# Patient Record
Sex: MALE | Employment: UNEMPLOYED | URBAN - METROPOLITAN AREA
[De-identification: names, ages, dates, MRNs, and addresses within clinical notes are randomized per-mention and may not be internally consistent; named-entity substitution may affect disease eponyms.]

---

## 2024-05-16 ENCOUNTER — HOSPITAL ENCOUNTER (INPATIENT)
Age: 44
DRG: 559 | End: 2024-05-16
Attending: STUDENT IN AN ORGANIZED HEALTH CARE EDUCATION/TRAINING PROGRAM | Admitting: STUDENT IN AN ORGANIZED HEALTH CARE EDUCATION/TRAINING PROGRAM
Payer: OTHER GOVERNMENT

## 2024-05-16 DIAGNOSIS — S82.252A CLOSED DISPLACED COMMINUTED FRACTURE OF SHAFT OF LEFT TIBIA, INITIAL ENCOUNTER: Primary | ICD-10-CM

## 2024-05-16 PROCEDURE — 1280000000 HC REHAB R&B

## 2024-05-16 PROCEDURE — 6370000000 HC RX 637 (ALT 250 FOR IP): Performed by: STUDENT IN AN ORGANIZED HEALTH CARE EDUCATION/TRAINING PROGRAM

## 2024-05-16 RX ORDER — GINSENG 100 MG
CAPSULE ORAL 2 TIMES DAILY
Status: DISCONTINUED | OUTPATIENT
Start: 2024-05-16 | End: 2024-05-30 | Stop reason: HOSPADM

## 2024-05-16 RX ORDER — ACETAMINOPHEN 500 MG
1000 TABLET ORAL 3 TIMES DAILY
Status: DISCONTINUED | OUTPATIENT
Start: 2024-05-16 | End: 2024-05-30 | Stop reason: HOSPADM

## 2024-05-16 RX ORDER — POLYETHYLENE GLYCOL 3350 17 G/17G
17 POWDER, FOR SOLUTION ORAL DAILY
Status: DISCONTINUED | OUTPATIENT
Start: 2024-05-16 | End: 2024-05-27

## 2024-05-16 RX ORDER — LANOLIN ALCOHOL/MO/W.PET/CERES
3 CREAM (GRAM) TOPICAL EVERY EVENING
Status: DISCONTINUED | OUTPATIENT
Start: 2024-05-16 | End: 2024-05-30 | Stop reason: HOSPADM

## 2024-05-16 RX ORDER — LIDOCAINE 4 G/G
1 PATCH TOPICAL DAILY
Status: DISCONTINUED | OUTPATIENT
Start: 2024-05-16 | End: 2024-05-30 | Stop reason: HOSPADM

## 2024-05-16 RX ORDER — SENNA AND DOCUSATE SODIUM 50; 8.6 MG/1; MG/1
2 TABLET, FILM COATED ORAL NIGHTLY
Status: DISCONTINUED | OUTPATIENT
Start: 2024-05-16 | End: 2024-05-20

## 2024-05-16 RX ORDER — OXYCODONE HYDROCHLORIDE 5 MG/1
5 TABLET ORAL EVERY 4 HOURS PRN
Status: DISCONTINUED | OUTPATIENT
Start: 2024-05-16 | End: 2024-05-20

## 2024-05-16 RX ORDER — METHOCARBAMOL 500 MG/1
1000 TABLET, FILM COATED ORAL 3 TIMES DAILY
Status: DISPENSED | OUTPATIENT
Start: 2024-05-16 | End: 2024-05-23

## 2024-05-16 RX ORDER — OXYCODONE HYDROCHLORIDE 5 MG/1
10 TABLET ORAL EVERY 4 HOURS PRN
Status: DISCONTINUED | OUTPATIENT
Start: 2024-05-16 | End: 2024-05-20

## 2024-05-16 RX ADMIN — MELATONIN TAB 3 MG 3 MG: 3 TAB at 21:31

## 2024-05-16 RX ADMIN — BACITRACIN: 500 OINTMENT TOPICAL at 21:16

## 2024-05-16 RX ADMIN — METOPROLOL TARTRATE 12.5 MG: 25 TABLET, FILM COATED ORAL at 21:22

## 2024-05-16 RX ADMIN — OXYCODONE 10 MG: 5 TABLET ORAL at 16:56

## 2024-05-16 RX ADMIN — METHOCARBAMOL TABLETS 1000 MG: 500 TABLET, COATED ORAL at 21:22

## 2024-05-16 RX ADMIN — OXYCODONE 10 MG: 5 TABLET ORAL at 21:22

## 2024-05-16 RX ADMIN — ACETAMINOPHEN 1000 MG: 500 TABLET ORAL at 21:21

## 2024-05-16 RX ADMIN — APIXABAN 5 MG: 5 TABLET, FILM COATED ORAL at 21:21

## 2024-05-16 ASSESSMENT — PAIN DESCRIPTION - ORIENTATION
ORIENTATION: RIGHT
ORIENTATION: RIGHT;LOWER
ORIENTATION: RIGHT;LEFT;LOWER;UPPER

## 2024-05-16 ASSESSMENT — PAIN SCALES - GENERAL
PAINLEVEL_OUTOF10: 3
PAINLEVEL_OUTOF10: 3
PAINLEVEL_OUTOF10: 9
PAINLEVEL_OUTOF10: 7
PAINLEVEL_OUTOF10: 2
PAINLEVEL_OUTOF10: 6
PAINLEVEL_OUTOF10: 5
PAINLEVEL_OUTOF10: 3

## 2024-05-16 ASSESSMENT — PAIN DESCRIPTION - ONSET
ONSET: GRADUAL
ONSET: ON-GOING
ONSET: ON-GOING

## 2024-05-16 ASSESSMENT — PAIN DESCRIPTION - LOCATION
LOCATION: ARM;LEG
LOCATION: OTHER (COMMENT)

## 2024-05-16 ASSESSMENT — PAIN DESCRIPTION - DESCRIPTORS
DESCRIPTORS: THROBBING
DESCRIPTORS: ACHING
DESCRIPTORS: ACHING
DESCRIPTORS: THROBBING

## 2024-05-16 ASSESSMENT — PAIN - FUNCTIONAL ASSESSMENT
PAIN_FUNCTIONAL_ASSESSMENT: PREVENTS OR INTERFERES SOME ACTIVE ACTIVITIES AND ADLS

## 2024-05-16 ASSESSMENT — PAIN DESCRIPTION - FREQUENCY
FREQUENCY: CONTINUOUS

## 2024-05-16 ASSESSMENT — PAIN SCALES - WONG BAKER: WONGBAKER_NUMERICALRESPONSE: NO HURT

## 2024-05-16 NOTE — PLAN OF CARE
ARU PATIENT TREATMENT PLAN  [facility]  [address] [place] [address]  [phone]      Aly Bernard    : 1980  Acct #: [id number]  MRN: [id number]  PHYSICIAN:  Ryan Solitario MD  Primary Problem    Patient Active Problem List   Diagnosis    Closed displaced comminuted fracture of shaft of left tibia, initial encounter       Rehabilitation Diagnosis:      #. Left comminuted and displaced left third metacarpal base fracture, minimally displaced left fourth proximal phalanx fracture, left radioulnar dislocation  - managed non-op  - NWB LUE  - Maintain splint     #. Left mid-shaft tibial fracture  - s/p IM nail on   - WBAT     #. Right knee soft tissue injuries  - s/p I&D w/ wound closure on   - WBAT in knee immobilizer.      #. Grade 1-2 B CVI right vertebral artery  #. 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 SVT  - s/p adenosine on   - TTE on  with normal EF, no RWMA  - metoprolol 12.5 mg BID     #. Diffuse abrasions  - Bacitracin BID     #. Mild TBI  - (+) post-traumatic amnesia, unknown duration of LOC  - Hx of prior TBI, mood disorder  - No post-concussive symptoms at present. Cognitive eval WFL at [facility]      #. EtOH use disorder  - Pt endorses insight into problematic drinking habits  - Interested in community resources for sobriety, appreciate SW/CM assistance in providing patient with this     Chronic Conditions  - HLD: Previously on fenofibrate (has not taken in over 1 year). Not recommended at discharge from [facility]. Will discuss resuming with patient while on ARU.   - Morbid obesity    ADMIT DATE:2024    Patient Goals: To get back to being active  Admitting Impairments: Orthopedic Disorder - 8.4 - Major Multiple Fractures  Activities: Impaired Eating, Hygiene, Toileting, Bathing, Dressing, Bed Mobility, Transfers, Ambulation, Stairs, and Endurance.  Participation: Prior to admission  patient was living at home with significant other, was independent with all mobility and activities, was an active , working full time.     CARE PLAN     NURSING:  Aly Bernard while on this unit will:  [] Be continent of bowel and bladder     [x] Have an adequate number of bowel movements  [x] Urinate with no urinary retention >300ml in bladder  [] Complete bladder protocol with khanna removal  [] Maintain O2 SATs at ___%  [x] Have pain managed while on ARU       [] Be pain free by discharge   [x] Have no skin breakdown while on ARU  [x] Have improved skin integrity via wound measurements  [x] Have no signs/symptoms of infection at the wound site  [x] Be free from injury during hospitalization   [x] Complete education with patient/family with understanding demonstrated for:  [x] Adjustment   [] Other:     Nursing Interventions will include:  [] bowel/bladder training   [x] education for medical assistive devices   [x] medication education   [] O2 saturation management   [x] energy conservation   [x] stress management techniques   [x] fall prevention   [x] alarms protocol   [x] seating and positioning   [x] skin/wound care   [x] pressure relief instruction   [x] dressing changes     [x] infection protection   [x] DVT prophylaxis  [] assistance with in room safety with transfers to bed, toilet, wheelchair, shower   [x] bathroom activities and hygiene  [] Other:    Patient/Caregiver Education for:  [x] Disease/sustained injury/management     [x] Medication Use  [] Surgical intervention  [x] Safety  [x] Body mechanics and or joint protection  [x] Health maintenance     [] Other:     PHYSICAL THERAPY:  Goals:                   Short Term Goals:  Time Frame: 1-2 weeks  Patient will complete bed mobility at minimal assistance   Patient will complete transfers at minimal assistance   Patient will ambulate 25 ft with use of LRAD at minimal assistance  Patient will ascend/descend 1 stairs with (B) handrail at moderate  PT, ADRIANA [id number]  5/17/24  1:18 PM

## 2024-05-16 NOTE — PROGRESS NOTES
ARU Admission Assessment    Ethnicity  \"Are you of , /a, or [place]an origin?\"  Check all that apply:  [] A.  No, not of , /a, or [place]an Origin  [] B.  Yes, Yemeni, Yemeni American, Chicano/a  [x] C.  Yes, Kazakh  [] D.  Yes, [place]  [x] E.  Yes, another , , or [place]an origin  [] X.  Patient unable to respond  [] Y.  Patient declines to respond    Race  \"What is your race?\"  Check all that apply:  [] A.  White  [] B.  Black or   [] C.   or   [] D.   Citizen of [place]  [] E.  Chinese  [] F.  Northern Irish  [] G. Panamanian  [] H.  Icelandic  [] I.  Mongolian  [] J.  Other   [] K.    [] L.  Zimbabwean or Shashank  [] M.  Martiniquais  [x] N.  Other   [] X.  Patient unable to respond  [] Y.  Patient declines to respond  [] Z.  None of the above    Language  A.  \"What is your preferred language?\"   english    B.  \"Do you need or want an  to communicate with a doctor or health care staff?\"  Check only one:  [x] 0.  No  [] 1.  Yes  [] 9.  Unable to determine    Transportation  \"Has lack of transportation kept you from medical appointments, meetings, work, or from getting things needed for daily living?\"Check all that apply:  [] A.  Yes, it has kept me from medical appointments or from getting my medications  [] B.  Yes, it has kept me from non-medical meetings, appointments, work, or from getting things that I need  [x] C.  No  [] X.  Patient unable to respond  [] Y.  Patient declines to respond    Hearing  Ability to hear (with hearing aid or hearing appliances if normally used)  []  0.  Adequate - no difficulty in normal conversation, social interaction, listening to TV  [x]  1.  Minimal difficulty - difficulty in some environments (e.g. when person speaks softly or setting is noisy)  []  2.  Moderate difficulty - speaker has to increase volume and speak distinctly   []  3.  Highly impaired - absence of  score 0-27, or enter 99 if unable to complete (if symptom frequency (column 2) is blank for 3 or more items).   0     Social Isolation  \"How often do you feel lonely or isolated from those around you?\"  [x] 0.  Never  [] 1.  Rarely  [] 2.  Sometimes  [] 3.  Often  [] 4.  Always  [] 7.  Patient declines to respond  [] 8.  Patient unable to respond    Pain Effect on Sleep  \"Over the past 5 days, how much of the time has pain made it hard for you to sleep at night?\"  []  0.  Does not apply - I have not had any pain or hurting in the past 5 days  []  1.  Rarely or not at all  []  2.  Occasionally  [x]  3.  Frequently  []  4.  Almost constantly  []  8.  Unable to answer    **If the patient answers \"0.  Does not apply\" to this question, skip the next two \"Pain Effect...\" questions**    Pain Interference with Therapy Activities  \"Over the past 5 days, how often have you limited your participation in rehabilitation therapy sessions due to pain?\"  []  0.  Does not apply - I have not received rehabilitation therapy in the past 5 days  []  1.  Rarely or not at all  []  2.  Occasionally  []  3.  Frequently  [x]  4.  Almost constantly  []  8.  Unable to answer    Pain Interference with Day-to-Day Activities:  \"Over the past 5 days, how often have you limited your day-to-day activities (excluding rehabilitation therapy session)?\"  []  1.  Rarely or not at all  []  2.  Occasionally  []  3.  Frequently  [x]  4.  Almost constantly  []  8.  Unable to answer    Nutritional Approaches  Check all of the following nutritional approaches that apply on admission:  []  A.  Parenteral/IV feeding (including IV fluids if needed for hydration, but not as part of dialysis/chemo)  []  B.  Feeding tube (e.g., nasogastric or abdominal (PEG))  []  C.  Mechanically altered diet - requires change in texture of food or liquids (e.g., pureed food, thickened liquids)  []  D.  Therapeutic diet (e.g., low salt, diabetic, low cholesterol)  [x]  Z.  None of  the above    High Risk Drug Classes:  Use and Indication    Is taking: Check if the pt is taking any medications by pharmacological classification, not how it is used, in the following classes  Indication noted: If column 1 is checked, check if there is an indication noted for all meds in the drug class Is taking  (check all that apply) Indication noted (check all that apply)   Antipsychotic [] []   Anticoagulant [x] [x]   Antibiotic [x] [x]   Opioid [x] [x]   Antiplatelet [] []   Hypoglycemic (including insulin) [] []   None of the above []     Special Treatments, Procedures, and Programs    Check all of the following treatments, procedures, and programs that apply on admission. On admission (check all that apply)   Cancer Treatments   A1. Chemotherapy []           A2. IV []           A3. Oral []           A10. Other []   B1. Radiation []   Respiratory Therapies   C1. Oxygen Therapy []           C2. Continuous (continuously for at least 14 hours per day) []           C3. Intermittent []           C4. High-concentration []   D1. Suctioning (Does not include oral suctioning) []           D2. Scheduled []           D3. As needed []   E1. Tracheostomy Care []   F1. Invasive Mechanical Ventilator (ventilator or respirator) []   G1. Non-invasive Mechanical Ventilator []           G2. BiPAP []           G3. CPAP []   Other   H1. IV Medications (Do not include sub Q pumps, flushes, Dextrose 50% or lactated ringers) []           H2. Vasoactive medications []           H3. Antibiotics []           H4. Anticoagulation []           H10. Other []   I1. Transfusions []   J1. Dialysis []           J2. Hemodialysis []           J3. Peritoneal dialysis []   O1. IV access (including a catheter in a vein) []           O2. Peripheral []           O3. Midline []           O4. Central (PICC, tunneled, port) []      None of the above (select if no Cancer, Respiratory, or Other boxes are checked) [x]     The above items have been reviewed

## 2024-05-16 NOTE — PROGRESS NOTES
Patient was admitted to room 4910 at 1600.  Patient was oriented to the Call Light, Phone, TV, Thermostat, Bed Controls, Bathroom and Emergency Cord.  Patient verbalized and demonstrated understanding of all.  Patient was also given an overview of Unit Routines for Acute Rehab, including the patient's rights and responsibilities as well as the CMS IRF [employer] Privacy Act Statement providing notice of data collection.  Patient states that their normal bowel regime is daily. Meal times were explained, including how to order food.  The white board, (which is posted on the wall by the door is used for communication) has the Therapy Scheduled that is posted each day along with the name of your doctor, nurse, and therapist for your convenience.  We recommend any family that will be care givers or any care givers the patient has, take part in therapy.  We have no set visiting hours, we suggest non-caregiver friends and family visitors come after therapy (at 4 PM or later) to allow patient to rest in between sessions.      In conjunction with the patient and patient’s family, this nurse worked to establish a tailored Fall TIPS plan to ensure patient safety and compliance:    Falls TIPS Completion    Patient identified as increased risk for harm if fall:  [] Yes     Fall Risks  History of Falls:    [] Yes   Medication Side Effects:   [x] Yes   Walking Aid:    [x] Yes   IV Pole or Equipment:   [] Yes   Unsteady Walk:     [x] Yes   May Forget or Choose Not to Call: [x] Yes     Fall Interventions   Communicate Recent Fall and/or Risk of Harm: [] Yes   Walking Aids:  Crutches: [] Yes   Cane: [] Yes   Walker: [x] Yes   IV Assistance When Walking: [] Yes   Toileting Schedule: Every 2 Hours  Bedpan:   [] Yes   Assist to Commode: [] Yes   Assist to Bathroom: [x] Yes   Bed Alarm On: [x] Yes   Assistance Out of Bed:  Bedrest: [] Yes   1 Person: [] Yes   2 People: [x] Yes

## 2024-05-16 NOTE — PROGRESS NOTES
4 Eyes Skin Assessment     NAME:  Aly Bernard  YOB: 1980  MEDICAL RECORD NUMBER:  [id number]    The patient is being assessed for  Admission    I agree that at least one RN has performed a thorough Head to Toe Skin Assessment on the patient. ALL assessment sites listed below have been assessed.      Areas assessed by both nurses:    Head, Face, Ears, Shoulders, Back, Chest, Arms, Elbows, Hands, Sacrum. Buttock, Coccyx, Ischium, and Legs. Feet and Heels        Does the Patient have a Wound? Yes wound(s) were present on assessment. LDA wound assessment was Initiated and completed by RN        Darío Prevention initiated by RN: Yes  Wound Care Orders initiated by RN: Yes    Pressure Injury (Stage 3,4, Unstageable, DTI, NWPT, and Complex wounds) if present, place Wound referral order by RN under : No    New Ostomies, if present place, Ostomy referral order under : No     Nurse 1 eSignature: Electronically signed by Jackie Brewster RN on 5/16/24 at 6:01 PM EDT    **SHARE this note so that the co-signing nurse can place an eSignature**    Nurse 2 eSignature: Electronically signed by Heather Boyd RN on 5/16/24 at 6:02 PM EDT

## 2024-05-17 LAB
ANION GAP SERPL CALCULATED.3IONS-SCNC: 14 MMOL/L (ref 3–16)
BASOPHILS # BLD: 0.1 K/UL (ref 0–0.2)
BASOPHILS NFR BLD: 1 %
BUN SERPL-MCNC: 17 MG/DL (ref 7–20)
CALCIUM SERPL-MCNC: 9.6 MG/DL (ref 8.3–10.6)
CHLORIDE SERPL-SCNC: 97 MMOL/L (ref 99–110)
CO2 SERPL-SCNC: 22 MMOL/L (ref 21–32)
CREAT SERPL-MCNC: 1.1 MG/DL (ref 0.9–1.3)
DEPRECATED RDW RBC AUTO: 12.8 % (ref 12.4–15.4)
EOSINOPHIL # BLD: 0.3 K/UL (ref 0–0.6)
EOSINOPHIL NFR BLD: 3 %
GFR SERPLBLD CREATININE-BSD FMLA CKD-EPI: 85 ML/MIN/{1.73_M2}
GLUCOSE SERPL-MCNC: 112 MG/DL (ref 70–99)
HCT VFR BLD AUTO: 42.4 % (ref 40.5–52.5)
HGB BLD-MCNC: 13.9 G/DL (ref 13.5–17.5)
LYMPHOCYTES # BLD: 2.1 K/UL (ref 1–5.1)
LYMPHOCYTES NFR BLD: 23.5 %
MCH RBC QN AUTO: 29.7 PG (ref 26–34)
MCHC RBC AUTO-ENTMCNC: 32.8 G/DL (ref 31–36)
MCV RBC AUTO: 90.7 FL (ref 80–100)
MONOCYTES # BLD: 1 K/UL (ref 0–1.3)
MONOCYTES NFR BLD: 11.7 %
NEUTROPHILS # BLD: 5.3 K/UL (ref 1.7–7.7)
NEUTROPHILS NFR BLD: 60.8 %
PLATELET # BLD AUTO: 381 K/UL (ref 135–450)
PMV BLD AUTO: 8.5 FL (ref 5–10.5)
POTASSIUM SERPL-SCNC: 4.5 MMOL/L (ref 3.5–5.1)
RBC # BLD AUTO: 4.68 M/UL (ref 4.2–5.9)
SODIUM SERPL-SCNC: 133 MMOL/L (ref 136–145)
WBC # BLD AUTO: 8.7 K/UL (ref 4–11)

## 2024-05-17 PROCEDURE — 97535 SELF CARE MNGMENT TRAINING: CPT

## 2024-05-17 PROCEDURE — 6370000000 HC RX 637 (ALT 250 FOR IP): Performed by: STUDENT IN AN ORGANIZED HEALTH CARE EDUCATION/TRAINING PROGRAM

## 2024-05-17 PROCEDURE — 97530 THERAPEUTIC ACTIVITIES: CPT

## 2024-05-17 PROCEDURE — 97162 PT EVAL MOD COMPLEX 30 MIN: CPT

## 2024-05-17 PROCEDURE — 36415 COLL VENOUS BLD VENIPUNCTURE: CPT

## 2024-05-17 PROCEDURE — 80048 BASIC METABOLIC PNL TOTAL CA: CPT

## 2024-05-17 PROCEDURE — 1280000000 HC REHAB R&B

## 2024-05-17 PROCEDURE — 97166 OT EVAL MOD COMPLEX 45 MIN: CPT

## 2024-05-17 PROCEDURE — 85025 COMPLETE CBC W/AUTO DIFF WBC: CPT

## 2024-05-17 RX ADMIN — OXYCODONE 10 MG: 5 TABLET ORAL at 18:10

## 2024-05-17 RX ADMIN — MELATONIN TAB 3 MG 3 MG: 3 TAB at 21:54

## 2024-05-17 RX ADMIN — BACITRACIN: 500 OINTMENT TOPICAL at 21:56

## 2024-05-17 RX ADMIN — METHOCARBAMOL TABLETS 1000 MG: 500 TABLET, COATED ORAL at 09:10

## 2024-05-17 RX ADMIN — METOPROLOL TARTRATE 12.5 MG: 25 TABLET, FILM COATED ORAL at 09:56

## 2024-05-17 RX ADMIN — APIXABAN 5 MG: 5 TABLET, FILM COATED ORAL at 09:56

## 2024-05-17 RX ADMIN — ACETAMINOPHEN 1000 MG: 500 TABLET ORAL at 09:56

## 2024-05-17 RX ADMIN — APIXABAN 5 MG: 5 TABLET, FILM COATED ORAL at 21:55

## 2024-05-17 RX ADMIN — ACETAMINOPHEN 1000 MG: 500 TABLET ORAL at 14:24

## 2024-05-17 RX ADMIN — OXYCODONE 10 MG: 5 TABLET ORAL at 06:33

## 2024-05-17 RX ADMIN — METHOCARBAMOL TABLETS 1000 MG: 500 TABLET, COATED ORAL at 21:55

## 2024-05-17 RX ADMIN — OXYCODONE 10 MG: 5 TABLET ORAL at 12:41

## 2024-05-17 RX ADMIN — POLYETHYLENE GLYCOL 3350 17 G: 17 POWDER, FOR SOLUTION ORAL at 09:57

## 2024-05-17 RX ADMIN — ACETAMINOPHEN 1000 MG: 500 TABLET ORAL at 21:54

## 2024-05-17 RX ADMIN — METHOCARBAMOL TABLETS 1000 MG: 500 TABLET, COATED ORAL at 14:24

## 2024-05-17 RX ADMIN — BACITRACIN: 500 OINTMENT TOPICAL at 14:23

## 2024-05-17 RX ADMIN — METOPROLOL TARTRATE 12.5 MG: 25 TABLET, FILM COATED ORAL at 21:55

## 2024-05-17 ASSESSMENT — PAIN DESCRIPTION - LOCATION
LOCATION: ABDOMEN;GENERALIZED
LOCATION: ABDOMEN
LOCATION: GENERALIZED
LOCATION: ABDOMEN;GENERALIZED
LOCATION: GENERALIZED
LOCATION: LEG;ARM;BACK
LOCATION: GENERALIZED
LOCATION: GENERALIZED

## 2024-05-17 ASSESSMENT — PAIN DESCRIPTION - ORIENTATION
ORIENTATION: RIGHT;LEFT
ORIENTATION: RIGHT
ORIENTATION: RIGHT;LEFT;LOWER

## 2024-05-17 ASSESSMENT — PAIN DESCRIPTION - ONSET: ONSET: ON-GOING

## 2024-05-17 ASSESSMENT — PAIN SCALES - GENERAL
PAINLEVEL_OUTOF10: 7
PAINLEVEL_OUTOF10: 9
PAINLEVEL_OUTOF10: 6
PAINLEVEL_OUTOF10: 3
PAINLEVEL_OUTOF10: 7
PAINLEVEL_OUTOF10: 3
PAINLEVEL_OUTOF10: 6
PAINLEVEL_OUTOF10: 2
PAINLEVEL_OUTOF10: 9
PAINLEVEL_OUTOF10: 9
PAINLEVEL_OUTOF10: 7
PAINLEVEL_OUTOF10: 3

## 2024-05-17 ASSESSMENT — PAIN DESCRIPTION - FREQUENCY: FREQUENCY: CONTINUOUS

## 2024-05-17 ASSESSMENT — PAIN DESCRIPTION - DESCRIPTORS
DESCRIPTORS: ACHING;CRAMPING
DESCRIPTORS: CRAMPING
DESCRIPTORS: ACHING

## 2024-05-17 ASSESSMENT — PAIN - FUNCTIONAL ASSESSMENT
PAIN_FUNCTIONAL_ASSESSMENT: ACTIVITIES ARE NOT PREVENTED
PAIN_FUNCTIONAL_ASSESSMENT: ACTIVITIES ARE NOT PREVENTED

## 2024-05-17 ASSESSMENT — PAIN SCALES - WONG BAKER: WONGBAKER_NUMERICALRESPONSE: NO HURT
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
## 2024-05-17 NOTE — PLAN OF CARE
Problem: Pain  Goal: Verbalizes/displays adequate comfort level or baseline comfort level  Outcome: Progressing  Flowsheets (Taken 5/16/2024 1645 by Jackie Brewster, RN)  Verbalizes/displays adequate comfort level or baseline comfort level: Encourage patient to monitor pain and request assistance     Problem: ABCDS Injury Assessment  Goal: Absence of physical injury  Outcome: Progressing  Flowsheets (Taken 5/16/2024 1632 by Jackie Brewster, RN)  Absence of Physical Injury: Implement safety measures based on patient assessment     Problem: Skin/Tissue Integrity  Goal: Absence of new skin breakdown  Description: 1.  Monitor for areas of redness and/or skin breakdown  2.  Assess vascular access sites hourly  3.  Every 4-6 hours minimum:  Change oxygen saturation probe site  4.  Every 4-6 hours:  If on nasal continuous positive airway pressure, respiratory therapy assess nares and determine need for appliance change or resting period.  Outcome: Progressing     Problem: Safety - Adult  Goal: Free from fall injury  Outcome: Progressing
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
## 2024-05-17 NOTE — PROGRESS NOTES
Admission Period/Goal QM Codes for Aly Bernard.    QM Admit Code Goal Code   Eating     Oral Hygiene     Toileting Hygiene     Shower/Bathing     UB Dressing     LB Dressing     Putting on/off Footwear     Rolling Left and Right     Sit To Lying     Lying to Sitting on Bedside     Sit to Stand     Chair/Bed to Chair Transfer     Toilet Transfers     Car Transfers     Walk 10 Feet     Walk 50 Feet with Two Turns     Walk 150 Feet     Walk 10 Feet on Uneven Surfaces     1 Step (Curb)     4 Steps     12 Steps     Picking up Object from Floor     Wheel 50 Feet with 2 Turns     Type     Wheel 150 Feet     Type         The above codes were determined by the treatment team to be the patient's accurate admission assessment codes based on assessment performed soon after the patient's admission and prior to the benefit of services provided by staff, or if appropriate, the patient's usual performance at admission.    OT:       PT:       RN:       ST:       :
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
## 2024-05-18 PROCEDURE — 1280000000 HC REHAB R&B

## 2024-05-18 PROCEDURE — 97535 SELF CARE MNGMENT TRAINING: CPT

## 2024-05-18 PROCEDURE — 97530 THERAPEUTIC ACTIVITIES: CPT

## 2024-05-18 PROCEDURE — 6370000000 HC RX 637 (ALT 250 FOR IP): Performed by: STUDENT IN AN ORGANIZED HEALTH CARE EDUCATION/TRAINING PROGRAM

## 2024-05-18 RX ADMIN — OXYCODONE 10 MG: 5 TABLET ORAL at 21:17

## 2024-05-18 RX ADMIN — ACETAMINOPHEN 1000 MG: 500 TABLET ORAL at 08:26

## 2024-05-18 RX ADMIN — METHOCARBAMOL TABLETS 1000 MG: 500 TABLET, COATED ORAL at 08:25

## 2024-05-18 RX ADMIN — BACITRACIN: 500 OINTMENT TOPICAL at 21:22

## 2024-05-18 RX ADMIN — METHOCARBAMOL TABLETS 1000 MG: 500 TABLET, COATED ORAL at 21:18

## 2024-05-18 RX ADMIN — MELATONIN TAB 3 MG 3 MG: 3 TAB at 21:17

## 2024-05-18 RX ADMIN — OXYCODONE 10 MG: 5 TABLET ORAL at 05:00

## 2024-05-18 RX ADMIN — ACETAMINOPHEN 1000 MG: 500 TABLET ORAL at 15:34

## 2024-05-18 RX ADMIN — ACETAMINOPHEN 1000 MG: 500 TABLET ORAL at 21:17

## 2024-05-18 RX ADMIN — OXYCODONE 10 MG: 5 TABLET ORAL at 10:35

## 2024-05-18 RX ADMIN — SENNOSIDES AND DOCUSATE SODIUM 2 TABLET: 50; 8.6 TABLET ORAL at 21:17

## 2024-05-18 RX ADMIN — BACITRACIN: 500 OINTMENT TOPICAL at 08:27

## 2024-05-18 RX ADMIN — APIXABAN 5 MG: 5 TABLET, FILM COATED ORAL at 21:18

## 2024-05-18 RX ADMIN — METOPROLOL TARTRATE 12.5 MG: 25 TABLET, FILM COATED ORAL at 21:18

## 2024-05-18 RX ADMIN — OXYCODONE 10 MG: 5 TABLET ORAL at 15:37

## 2024-05-18 RX ADMIN — APIXABAN 5 MG: 5 TABLET, FILM COATED ORAL at 08:27

## 2024-05-18 RX ADMIN — METHOCARBAMOL TABLETS 1000 MG: 500 TABLET, COATED ORAL at 15:34

## 2024-05-18 RX ADMIN — METOPROLOL TARTRATE 12.5 MG: 25 TABLET, FILM COATED ORAL at 08:27

## 2024-05-18 ASSESSMENT — PAIN DESCRIPTION - DESCRIPTORS
DESCRIPTORS: ACHING
DESCRIPTORS: THROBBING

## 2024-05-18 ASSESSMENT — PAIN - FUNCTIONAL ASSESSMENT
PAIN_FUNCTIONAL_ASSESSMENT: ACTIVITIES ARE NOT PREVENTED
PAIN_FUNCTIONAL_ASSESSMENT: ACTIVITIES ARE NOT PREVENTED
PAIN_FUNCTIONAL_ASSESSMENT: PREVENTS OR INTERFERES SOME ACTIVE ACTIVITIES AND ADLS
PAIN_FUNCTIONAL_ASSESSMENT: ACTIVITIES ARE NOT PREVENTED

## 2024-05-18 ASSESSMENT — PAIN DESCRIPTION - ORIENTATION
ORIENTATION: RIGHT;LEFT
ORIENTATION: RIGHT;LEFT;MID
ORIENTATION: LEFT

## 2024-05-18 ASSESSMENT — PAIN SCALES - GENERAL
PAINLEVEL_OUTOF10: 7
PAINLEVEL_OUTOF10: 8
PAINLEVEL_OUTOF10: 7
PAINLEVEL_OUTOF10: 3
PAINLEVEL_OUTOF10: 5
PAINLEVEL_OUTOF10: 3
PAINLEVEL_OUTOF10: 7

## 2024-05-18 ASSESSMENT — PAIN DESCRIPTION - LOCATION
LOCATION: LEG
LOCATION: BACK;ARM;LEG
LOCATION: ARM;LEG

## 2024-05-18 ASSESSMENT — PAIN DESCRIPTION - FREQUENCY: FREQUENCY: CONTINUOUS
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
## 2024-05-18 NOTE — PLAN OF CARE
Problem: Safety - Adult  Goal: Free from fall injury  5/18/2024 1819 by Anni Pierre RN  Outcome: Progressing  Note: Pt remains free from falls.  Safety precautions in place.  Bed in lowest position, bed/chair wheels locked, call light with in reach, bedside table in reach, bed/chair alarm on, fall risk wrist band on.

## 2024-05-18 NOTE — PLAN OF CARE
Problem: Safety - Adult  Goal: Free from fall injury  5/18/2024 1249 by Anni Pierre RN  Outcome: Progressing  Note: Pt remains free from falls.  Safety precautions in place.  Bed in lowest position, bed/chair wheels locked, call light with in reach, bedside table in reach, bed/chair alarm on, fall risk wrist band on.
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
## 2024-05-19 VITALS
BODY MASS INDEX: 41.39 KG/M2 | HEIGHT: 70 IN | HEART RATE: 80 BPM | OXYGEN SATURATION: 97 % | WEIGHT: 289.1 LBS | TEMPERATURE: 97.9 F | RESPIRATION RATE: 18 BRPM | SYSTOLIC BLOOD PRESSURE: 97 MMHG | DIASTOLIC BLOOD PRESSURE: 61 MMHG

## 2024-05-19 PROCEDURE — 6370000000 HC RX 637 (ALT 250 FOR IP): Performed by: STUDENT IN AN ORGANIZED HEALTH CARE EDUCATION/TRAINING PROGRAM

## 2024-05-19 PROCEDURE — 1280000000 HC REHAB R&B

## 2024-05-19 RX ADMIN — ACETAMINOPHEN 1000 MG: 500 TABLET ORAL at 14:27

## 2024-05-19 RX ADMIN — OXYCODONE 10 MG: 5 TABLET ORAL at 21:16

## 2024-05-19 RX ADMIN — METHOCARBAMOL TABLETS 1000 MG: 500 TABLET, COATED ORAL at 14:28

## 2024-05-19 RX ADMIN — BACITRACIN: 500 OINTMENT TOPICAL at 21:23

## 2024-05-19 RX ADMIN — METOPROLOL TARTRATE 12.5 MG: 25 TABLET, FILM COATED ORAL at 08:39

## 2024-05-19 RX ADMIN — MELATONIN TAB 3 MG 3 MG: 3 TAB at 21:16

## 2024-05-19 RX ADMIN — METHOCARBAMOL TABLETS 1000 MG: 500 TABLET, COATED ORAL at 21:15

## 2024-05-19 RX ADMIN — METHOCARBAMOL TABLETS 1000 MG: 500 TABLET, COATED ORAL at 08:39

## 2024-05-19 RX ADMIN — METOPROLOL TARTRATE 12.5 MG: 25 TABLET, FILM COATED ORAL at 22:32

## 2024-05-19 RX ADMIN — OXYCODONE 5 MG: 5 TABLET ORAL at 12:09

## 2024-05-19 RX ADMIN — APIXABAN 5 MG: 5 TABLET, FILM COATED ORAL at 08:39

## 2024-05-19 RX ADMIN — OXYCODONE 5 MG: 5 TABLET ORAL at 08:39

## 2024-05-19 RX ADMIN — BACITRACIN: 500 OINTMENT TOPICAL at 08:40

## 2024-05-19 RX ADMIN — ACETAMINOPHEN 1000 MG: 500 TABLET ORAL at 08:39

## 2024-05-19 RX ADMIN — APIXABAN 5 MG: 5 TABLET, FILM COATED ORAL at 21:16

## 2024-05-19 RX ADMIN — ACETAMINOPHEN 1000 MG: 500 TABLET ORAL at 21:16

## 2024-05-19 ASSESSMENT — PAIN SCALES - GENERAL
PAINLEVEL_OUTOF10: 7
PAINLEVEL_OUTOF10: 3
PAINLEVEL_OUTOF10: 7
PAINLEVEL_OUTOF10: 0
PAINLEVEL_OUTOF10: 8
PAINLEVEL_OUTOF10: 4

## 2024-05-19 ASSESSMENT — PAIN - FUNCTIONAL ASSESSMENT
PAIN_FUNCTIONAL_ASSESSMENT: ACTIVITIES ARE NOT PREVENTED

## 2024-05-19 ASSESSMENT — PAIN DESCRIPTION - LOCATION: LOCATION: LEG

## 2024-05-19 ASSESSMENT — PAIN DESCRIPTION - ORIENTATION: ORIENTATION: LEFT

## 2024-05-19 ASSESSMENT — PAIN DESCRIPTION - DESCRIPTORS: DESCRIPTORS: THROBBING

## 2024-05-19 ASSESSMENT — PAIN DESCRIPTION - FREQUENCY: FREQUENCY: CONTINUOUS

## 2024-05-19 NOTE — PLAN OF CARE
Problem: Safety - Adult  Goal: Free from fall injury  5/19/2024 1038 by Anni Pierre RN  Outcome: Progressing  Note: Pt remains free from falls.  Safety precautions in place.  Bed in lowest position, bed/chair wheels locked, call light with in reach, bedside table in reach, bed/chair alarm on, fall risk wrist band on.  5/19/2024 0154 by Palma Mims RN  Outcome: Progressing     Problem: Pain  Goal: Verbalizes/displays adequate comfort level or baseline comfort level  5/19/2024 0154 by Palma Mims RN  Outcome: Progressing  5/19/2024 0151 by Palma Mims RN  Outcome: Progressing

## 2024-05-19 NOTE — PLAN OF CARE
Problem: Discharge Planning  Goal: Discharge to home or other facility with appropriate resources  Outcome: Progressing     Problem: ABCDS Injury Assessment  Goal: Absence of physical injury  5/19/2024 0154 by Palma Mims RN  Outcome: Progressing  5/19/2024 0151 by Palma Mims RN  Outcome: Progressing     Problem: Pain  Goal: Verbalizes/displays adequate comfort level or baseline comfort level  5/19/2024 0154 by Palma Mims RN  Outcome: Progressing     Problem: Pain  Goal: Verbalizes/displays adequate comfort level or baseline comfort level  5/19/2024 0151 by Palma Mims RN  Outcome: Progressing     Problem: Safety - Adult  Goal: Free from fall injury  5/19/2024 0154 by Palma Mims RN  Outcome: Progressing

## 2024-05-19 NOTE — PLAN OF CARE
Problem: Discharge Planning  Goal: Discharge to home or other facility with appropriate resources  Outcome: Progressing     Problem: ABCDS Injury Assessment  Goal: Absence of physical injury  Outcome: Progressing     Problem: Pain  Goal: Verbalizes/displays adequate comfort level or baseline comfort level  Outcome: Progressing

## 2024-05-20 PROCEDURE — 6370000000 HC RX 637 (ALT 250 FOR IP): Performed by: STUDENT IN AN ORGANIZED HEALTH CARE EDUCATION/TRAINING PROGRAM

## 2024-05-20 PROCEDURE — 97535 SELF CARE MNGMENT TRAINING: CPT

## 2024-05-20 PROCEDURE — 97530 THERAPEUTIC ACTIVITIES: CPT

## 2024-05-20 PROCEDURE — 1280000000 HC REHAB R&B

## 2024-05-20 PROCEDURE — 97116 GAIT TRAINING THERAPY: CPT

## 2024-05-20 RX ORDER — OXYCODONE HYDROCHLORIDE 5 MG/1
5 TABLET ORAL EVERY 4 HOURS PRN
Status: DISCONTINUED | OUTPATIENT
Start: 2024-05-20 | End: 2024-05-30 | Stop reason: HOSPADM

## 2024-05-20 RX ORDER — SENNA AND DOCUSATE SODIUM 50; 8.6 MG/1; MG/1
2 TABLET, FILM COATED ORAL DAILY PRN
Status: DISCONTINUED | OUTPATIENT
Start: 2024-05-20 | End: 2024-05-30 | Stop reason: HOSPADM

## 2024-05-20 RX ADMIN — POLYETHYLENE GLYCOL 3350 17 G: 17 POWDER, FOR SOLUTION ORAL at 09:17

## 2024-05-20 RX ADMIN — METOPROLOL TARTRATE 12.5 MG: 25 TABLET, FILM COATED ORAL at 10:24

## 2024-05-20 RX ADMIN — ACETAMINOPHEN 1000 MG: 500 TABLET ORAL at 22:09

## 2024-05-20 RX ADMIN — METHOCARBAMOL TABLETS 1000 MG: 500 TABLET, COATED ORAL at 22:12

## 2024-05-20 RX ADMIN — APIXABAN 5 MG: 5 TABLET, FILM COATED ORAL at 22:10

## 2024-05-20 RX ADMIN — OXYCODONE 10 MG: 5 TABLET ORAL at 06:35

## 2024-05-20 RX ADMIN — ACETAMINOPHEN 1000 MG: 500 TABLET ORAL at 09:17

## 2024-05-20 RX ADMIN — APIXABAN 5 MG: 5 TABLET, FILM COATED ORAL at 09:17

## 2024-05-20 RX ADMIN — OXYCODONE 5 MG: 5 TABLET ORAL at 22:15

## 2024-05-20 RX ADMIN — ACETAMINOPHEN 1000 MG: 500 TABLET ORAL at 14:16

## 2024-05-20 RX ADMIN — METHOCARBAMOL TABLETS 1000 MG: 500 TABLET, COATED ORAL at 14:16

## 2024-05-20 RX ADMIN — METHOCARBAMOL TABLETS 1000 MG: 500 TABLET, COATED ORAL at 09:17

## 2024-05-20 RX ADMIN — MELATONIN TAB 3 MG 3 MG: 3 TAB at 22:09

## 2024-05-20 RX ADMIN — BACITRACIN: 500 OINTMENT TOPICAL at 22:17

## 2024-05-20 RX ADMIN — BACITRACIN: 500 OINTMENT TOPICAL at 10:25

## 2024-05-20 ASSESSMENT — PAIN - FUNCTIONAL ASSESSMENT
PAIN_FUNCTIONAL_ASSESSMENT: ACTIVITIES ARE NOT PREVENTED

## 2024-05-20 ASSESSMENT — PAIN DESCRIPTION - ORIENTATION
ORIENTATION: LEFT;RIGHT
ORIENTATION: LEFT

## 2024-05-20 ASSESSMENT — PAIN DESCRIPTION - DESCRIPTORS
DESCRIPTORS: THROBBING
DESCRIPTORS: THROBBING
DESCRIPTORS: ACHING
DESCRIPTORS: ACHING

## 2024-05-20 ASSESSMENT — PAIN SCALES - GENERAL
PAINLEVEL_OUTOF10: 2
PAINLEVEL_OUTOF10: 5
PAINLEVEL_OUTOF10: 7
PAINLEVEL_OUTOF10: 8
PAINLEVEL_OUTOF10: 7
PAINLEVEL_OUTOF10: 3
PAINLEVEL_OUTOF10: 4

## 2024-05-20 ASSESSMENT — PAIN DESCRIPTION - FREQUENCY: FREQUENCY: CONTINUOUS

## 2024-05-20 ASSESSMENT — PAIN DESCRIPTION - LOCATION
LOCATION: LEG

## 2024-05-20 ASSESSMENT — PAIN SCALES - WONG BAKER: WONGBAKER_NUMERICALRESPONSE: NO HURT

## 2024-05-20 ASSESSMENT — PAIN DESCRIPTION - ONSET: ONSET: ON-GOING

## 2024-05-20 NOTE — PLAN OF CARE
Problem: Discharge Planning  Goal: Discharge to home or other facility with appropriate resources  Outcome: Progressing     Problem: ABCDS Injury Assessment  Goal: Absence of physical injury  Outcome: Progressing     Problem: Pain  Goal: Verbalizes/displays adequate comfort level or baseline comfort level  Outcome: Progressing     Problem: Skin/Tissue Integrity  Goal: Absence of new skin breakdown  Description: 1.  Monitor for areas of redness and/or skin breakdown  2.  Assess vascular access sites hourly  3.  Every 4-6 hours minimum:  Change oxygen saturation probe site  4.  Every 4-6 hours:  If on nasal continuous positive airway pressure, respiratory therapy assess nares and determine need for appliance change or resting period.  Outcome: Progressing     Problem: Safety - Adult  Goal: Free from fall injury  5/19/2024 2002 by Palma Mims RN  Outcome: Progressing
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
## 2024-05-20 NOTE — PATIENT CARE CONFERENCE
[facility] Inpatient Rehabilitation Department  Weekly Team Conference Note    Patient Name: Aly Bernard      MRN: [id number]  : 1980  (43 y.o.) Gender: male     The team conference for this patient was held on 2024 at 11am and led by:  Ryan Solitario MD     CASE MANAGEMENT:  Assessment: Aly Bernard is a 43 year old Male that has been admitted to ARU. Patient lives in a bilevel home with his fiance  and mother. The patient anticipates to discharge home with needed supports. The SW will continue to follow and update the discharge plan as needed.     PHYSICAL THERAPY Current Status:  Bed Mobility:  Supine to Sit: stand by assistance  Comments: bed flat without use of HR  Transfers:  Sit to stand transfer: contact guard assistance  Stand to sit transfer: contact guard assistance  Stand pivot transfer: contact guard assistance (multiple completions without device including bed => w/c <=> shower and w/c => recliner   Comments: Impulsive at times.  VC for setup including hand and (R) LE placement  Ambulation:   Surface:level surface  Assistive Device: smyth rail  Other Appliance: wheelchair follow  Assistance: 2 person assistance with mod + min   Distance: 5 ft + unable to advance during 2nd attempt  Gait Mechanics: Step to mechanics.  Able to self advance (L) LE.  Significantly difficulty maintaining (L) SLS to allow (R) swing phase.    Comments:    Stair Mobility:  Stair mobility not completed on this date.  Comments:  Wheelchair Mobility:  Unable to self propel.   Goals:                  Patient Goals: To get back to being active   Short Term Goals:  Time Frame: 1-2 weeks  Patient will complete bed mobility at minimal assistance - goal met 2024  Patient will complete transfers at minimal assistance - goal met 2024  Patient will ambulate 25 ft with use of LRAD at minimal assistance  Patient will ascend/descend 1 stairs with (B) handrail at moderate assistance  Patient  work looks good, otherwise stable.  Patient has made excellent progress including progression to CGA for stand pivot transfers.  Ambulation significantly limited at this time due to pain and reduced ability to offload surgical LE.  Pt tolerated shower level ADL well with improved LB dressing status secondary to increased ability to maintain static stance at CGA for clothing management.  Factors facilitating achievement of goals:  PLOF, motivated to improve, non-involved extremity strength  Barriers to achievement of goals:  Pain, WB status restrictions, ROM restrictions, post surgical weakness, stairs in home   Interventions to address Barriers:  LE strengthening, balance training, gait training, ROM exercises as allowed by precautions, ADL/IADL training with AE as needed  Goals still appropriate:  Yes  Modifications to goals: None  Continue Current Plan of Care:  Yes  Modifications to Plan of Care: None    Rehab Team Members in attendance for Team Conference:  Megan Saunders, MSW, LSW    Keila Glasgow, RD, LD    Bautista Mccann, OTR/AUNG Mckee PT, DPT    MICHELE RandhawaN, RN, CRRN (Charge RN)    VIKASH Dutta PT, DPT,     I, the Rehab Physician, led the above team conference and agree with all decisions made, and approve the established interdisciplinary plan of care as documented within the medical record of Aly Bernard.    Ryan Solitario MD
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
## 2024-05-20 NOTE — PROGRESS NOTES
Aly Bernard  5/20/2024  [id number]    Chief Complaint: Closed displaced comminuted fracture of shaft of left tibia, initial encounter    Subjective:   No acute events over the weekend.    Appetite is adequate.  Reports frequent bowel movements, requesting bowel medications be made as needed.  Denies fevers/chills, chest pain, dyspnea, abdominal pain.  Agreeable to weaning oxycodone to 5 mg every 4 hours as needed.    Last BM: Stool Occurrence: 1 (05/20/24 1255)    Objective:  Patient Vitals for the past 24 hrs:   BP Temp Temp src Pulse Resp SpO2   05/20/24 1000 105/69 -- -- -- -- --   05/20/24 0900 101/66 98.6 °F (37 °C) Oral 77 18 99 %   05/20/24 0705 -- -- -- -- 18 --   05/20/24 0635 -- -- -- -- 18 --   05/19/24 2232 97/61 -- -- 80 -- --   05/19/24 2146 -- -- -- -- 18 --   05/19/24 2111 110/67 97.9 °F (36.6 °C) Oral -- 16 97 %     Gen: No distress, pleasant.   HEENT: Normocephalic, atraumatic.   CV: Regular rate and rhythm. Extremities warm, well perfused.   Resp: No respiratory distress. CTAB.   Abd: Soft, non-tender.  Ext: Right knee incision closed with sutures, healing well, superficial abrasion with healthy pink wound base and trace bleeding from medial aspect.  Left thigh and medial ankle incisions healing well, closed with sutures.  Neuro: Alert, oriented, appropriately interactive.     Laboratory data: Available via EMR.     Therapy progress:       PT    Supine to Sit: Supervision or touching assistance  Sit to Supine: Supervision or touching assistance   Sit to Stand: Supervision or touching assistance  Chair/Bed to Chair Transfer: Supervision or touching assistance  Car Transfer:    Ambulation 10 ft:    Ambulation 50 ft:    Ambulation 150 ft:    Stairs - 1 Step:    Stairs - 4 Step:    Stairs - 12 Step:      OT    Eating: Independent  Oral Hygiene: Setup or clean-up assistance  Bathing: Partial/moderate assistance  Upper Body Dressing: Partial/moderate assistance  Lower Body Dressing:  Substantial/maximal assistance  Toilet Transfer: Dependent  Toilet Hygiene: Dependent    Speech Therapy         Body mass index is 41.48 kg/m².    Assessment/Plan:  Functional progress: Ambulating 5' with min-mod assist of 2 people using hallway railing with wheelchair follow.  This patient continues to require an ARU level of care from all disciplines to address the following issues:    #. Left comminuted and displaced left third metacarpal base fracture, minimally displaced left fourth proximal phalanx fracture, left radioulnar dislocation  - managed non-op  - NWB LUE  - Maintain splint     #. Left mid-shaft tibial fracture  - s/p IM nail on 5/12  - WBAT     #. Right knee soft tissue injuries  - s/p I&D w/ wound closure on 5/12  - WBAT in knee immobilizer.      #. Grade 1-2 B CVI right vertebral artery  #. 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 SVT  - s/p adenosine on 5/12  - TTE on 5/12 with normal EF, no RWMA  - metoprolol 12.5 mg BID     #. Diffuse abrasions  - Bacitracin BID     #. Mild TBI  - (+) post-traumatic amnesia, unknown duration of LOC  - Hx of prior TBI, mood disorder  - No post-concussive symptoms at present. Cognitive eval WFL at [facility]      #. EtOH use disorder  - Pt endorses insight into problematic drinking habits  - Interested in community resources for sobriety, appreciate SW/CM assistance in providing patient with this     Chronic Conditions  - HLD: Previously on fenofibrate (has not taken in over 1 year). Not recommended at discharge from [facility]. Follow-up with PCP to discuss at outpatient.   - Morbid obesity     CODE: Full Code  Diet: ADULT DIET; Regular  Bowels: MiraLAX daily.  Senna-docusate daily as needed for constipation.   Bladder: Per protocol   Sleep: Melatonin 3 mg every evening  Pain: Tylenol 1 g 3 times daily scheduled, methocarbamol 1 g 3 times daily for muscle spasm, lidocaine patch daily, wean oxycodone to 5 mg every 4

## 2024-05-20 NOTE — PLAN OF CARE
Problem: Discharge Planning  Goal: Discharge to home or other facility with appropriate resources  Outcome: Progressing  Flowsheets (Taken 5/20/2024 0930)  Discharge to home or other facility with appropriate resources:   Identify barriers to discharge with patient and caregiver   Identify discharge learning needs (meds, wound care, etc)     Problem: ABCDS Injury Assessment  Goal: Absence of physical injury  Outcome: Progressing     Problem: Pain  Goal: Verbalizes/displays adequate comfort level or baseline comfort level  Outcome: Progressing  Flowsheets (Taken 5/20/2024 0900)  Verbalizes/displays adequate comfort level or baseline comfort level:   Encourage patient to monitor pain and request assistance   Assess pain using appropriate pain scale   Administer analgesics based on type and severity of pain and evaluate response   Implement non-pharmacological measures as appropriate and evaluate response     Problem: Skin/Tissue Integrity  Goal: Absence of new skin breakdown  Description: 1.  Monitor for areas of redness and/or skin breakdown  2.  Assess vascular access sites hourly  3.  Every 4-6 hours minimum:  Change oxygen saturation probe site  4.  Every 4-6 hours:  If on nasal continuous positive airway pressure, respiratory therapy assess nares and determine need for appliance change or resting period.  Outcome: Progressing     Problem: Safety - Adult  Goal: Free from fall injury  Outcome: Progressing

## 2024-05-21 PROCEDURE — 1280000000 HC REHAB R&B

## 2024-05-21 PROCEDURE — 97110 THERAPEUTIC EXERCISES: CPT

## 2024-05-21 PROCEDURE — 97530 THERAPEUTIC ACTIVITIES: CPT

## 2024-05-21 PROCEDURE — 97535 SELF CARE MNGMENT TRAINING: CPT

## 2024-05-21 PROCEDURE — 6370000000 HC RX 637 (ALT 250 FOR IP): Performed by: STUDENT IN AN ORGANIZED HEALTH CARE EDUCATION/TRAINING PROGRAM

## 2024-05-21 PROCEDURE — 97116 GAIT TRAINING THERAPY: CPT

## 2024-05-21 RX ADMIN — BACITRACIN: 500 OINTMENT TOPICAL at 08:34

## 2024-05-21 RX ADMIN — ACETAMINOPHEN 1000 MG: 500 TABLET ORAL at 23:26

## 2024-05-21 RX ADMIN — APIXABAN 5 MG: 5 TABLET, FILM COATED ORAL at 23:27

## 2024-05-21 RX ADMIN — MELATONIN TAB 3 MG 3 MG: 3 TAB at 23:27

## 2024-05-21 RX ADMIN — METOPROLOL TARTRATE 12.5 MG: 25 TABLET, FILM COATED ORAL at 08:33

## 2024-05-21 RX ADMIN — METHOCARBAMOL TABLETS 1000 MG: 500 TABLET, COATED ORAL at 23:26

## 2024-05-21 RX ADMIN — METHOCARBAMOL TABLETS 1000 MG: 500 TABLET, COATED ORAL at 14:54

## 2024-05-21 RX ADMIN — ACETAMINOPHEN 1000 MG: 500 TABLET ORAL at 08:33

## 2024-05-21 RX ADMIN — OXYCODONE 5 MG: 5 TABLET ORAL at 23:26

## 2024-05-21 RX ADMIN — POLYETHYLENE GLYCOL 3350 17 G: 17 POWDER, FOR SOLUTION ORAL at 08:33

## 2024-05-21 RX ADMIN — BACITRACIN: 500 OINTMENT TOPICAL at 23:28

## 2024-05-21 RX ADMIN — OXYCODONE 5 MG: 5 TABLET ORAL at 06:44

## 2024-05-21 RX ADMIN — APIXABAN 5 MG: 5 TABLET, FILM COATED ORAL at 08:33

## 2024-05-21 RX ADMIN — METOPROLOL TARTRATE 12.5 MG: 25 TABLET, FILM COATED ORAL at 23:27

## 2024-05-21 RX ADMIN — OXYCODONE 5 MG: 5 TABLET ORAL at 13:06

## 2024-05-21 RX ADMIN — METHOCARBAMOL TABLETS 1000 MG: 500 TABLET, COATED ORAL at 08:33

## 2024-05-21 RX ADMIN — ACETAMINOPHEN 1000 MG: 500 TABLET ORAL at 14:54

## 2024-05-21 ASSESSMENT — PAIN DESCRIPTION - DESCRIPTORS
DESCRIPTORS: ACHING
DESCRIPTORS: ACHING
DESCRIPTORS: THROBBING
DESCRIPTORS: ACHING
DESCRIPTORS: THROBBING

## 2024-05-21 ASSESSMENT — PAIN - FUNCTIONAL ASSESSMENT
PAIN_FUNCTIONAL_ASSESSMENT: ACTIVITIES ARE NOT PREVENTED

## 2024-05-21 ASSESSMENT — PAIN DESCRIPTION - LOCATION
LOCATION: LEG

## 2024-05-21 ASSESSMENT — PAIN DESCRIPTION - ORIENTATION
ORIENTATION: LEFT

## 2024-05-21 ASSESSMENT — PAIN SCALES - GENERAL
PAINLEVEL_OUTOF10: 5
PAINLEVEL_OUTOF10: 5
PAINLEVEL_OUTOF10: 2
PAINLEVEL_OUTOF10: 2
PAINLEVEL_OUTOF10: 5
PAINLEVEL_OUTOF10: 2
PAINLEVEL_OUTOF10: 8

## 2024-05-21 ASSESSMENT — PAIN DESCRIPTION - ONSET: ONSET: ON-GOING

## 2024-05-21 ASSESSMENT — PAIN DESCRIPTION - FREQUENCY
FREQUENCY: INTERMITTENT
FREQUENCY: CONTINUOUS

## 2024-05-21 ASSESSMENT — PAIN SCALES - WONG BAKER
WONGBAKER_NUMERICALRESPONSE: NO HURT
WONGBAKER_NUMERICALRESPONSE: NO HURT
WONGBAKER_NUMERICALRESPONSE: HURTS A LITTLE BIT

## 2024-05-21 NOTE — PLAN OF CARE
Problem: Discharge Planning  Goal: Discharge to home or other facility with appropriate resources  5/21/2024 1030 by Jackie Brewster, RN  Outcome: Progressing  Flowsheets (Taken 5/21/2024 0830)  Discharge to home or other facility with appropriate resources:   Identify barriers to discharge with patient and caregiver   Identify discharge learning needs (meds, wound care, etc)     Problem: ABCDS Injury Assessment  Goal: Absence of physical injury  5/21/2024 1030 by Jackie Brewster, RN  Outcome: Progressing     Problem: Pain  Goal: Verbalizes/displays adequate comfort level or baseline comfort level  5/21/2024 1030 by Jackie Brewster, RN  Outcome: Progressing  Flowsheets (Taken 5/21/2024 0830)  Verbalizes/displays adequate comfort level or baseline comfort level: Encourage patient to monitor pain and request assistance     Problem: Skin/Tissue Integrity  Goal: Absence of new skin breakdown  Description: 1.  Monitor for areas of redness and/or skin breakdown  2.  Assess vascular access sites hourly  3.  Every 4-6 hours minimum:  Change oxygen saturation probe site  4.  Every 4-6 hours:  If on nasal continuous positive airway pressure, respiratory therapy assess nares and determine need for appliance change or resting period.  5/21/2024 1030 by Jackie Brewster, RN  Outcome: Progressing     Problem: Safety - Adult  Goal: Free from fall injury  5/21/2024 1030 by Jackie Brewster, RN  Outcome: Progressing

## 2024-05-21 NOTE — PLAN OF CARE
Problem: Discharge Planning  Goal: Discharge to home or other facility with appropriate resources  5/21/2024 0147 by Марина Cowan RN  Outcome: Progressing  5/20/2024 1312 by Jackie Brewtser RN  Outcome: Progressing  Flowsheets (Taken 5/20/2024 0930)  Discharge to home or other facility with appropriate resources:   Identify barriers to discharge with patient and caregiver   Identify discharge learning needs (meds, wound care, etc)     Problem: ABCDS Injury Assessment  Goal: Absence of physical injury  5/21/2024 0147 by Марина Cowan RN  Outcome: Progressing  5/20/2024 1312 by Jackie Brewster RN  Outcome: Progressing     Problem: Pain  Goal: Verbalizes/displays adequate comfort level or baseline comfort level  5/21/2024 0147 by Марина Cowan RN  Outcome: Progressing  Flowsheets (Taken 5/20/2024 2202)  Verbalizes/displays adequate comfort level or baseline comfort level: Encourage patient to monitor pain and request assistance  5/20/2024 1312 by Jcakie Brewster RN  Outcome: Progressing  Flowsheets (Taken 5/20/2024 0900)  Verbalizes/displays adequate comfort level or baseline comfort level:   Encourage patient to monitor pain and request assistance   Assess pain using appropriate pain scale   Administer analgesics based on type and severity of pain and evaluate response   Implement non-pharmacological measures as appropriate and evaluate response     Problem: Skin/Tissue Integrity  Goal: Absence of new skin breakdown  Description: 1.  Monitor for areas of redness and/or skin breakdown  2.  Assess vascular access sites hourly  3.  Every 4-6 hours minimum:  Change oxygen saturation probe site  4.  Every 4-6 hours:  If on nasal continuous positive airway pressure, respiratory therapy assess nares and determine need for appliance change or resting period.  5/21/2024 0147 by Марина Cowan RN  Outcome: Progressing  5/20/2024 1312 by Jackie Brewster RN  Outcome: Progressing     Problem: Safety -  Adult  Goal: Free from fall injury  5/21/2024 0147 by Марина Cowan, RN  Outcome: Progressing  5/20/2024 1312 by Jackie Brewster, RN  Outcome: Progressing
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
## 2024-05-21 NOTE — PROGRESS NOTES
Aly Bernard  5/21/2024  [id number]    Chief Complaint: Closed displaced comminuted fracture of shaft of left tibia, initial encounter    Subjective:   No acute events overnight.     Tolerating reduced oxycodone dose, pain is adequately controlled on current regimen. Denies fevers, chills, chest pain, dyspnea, abdominal pain.    Last BM: Stool Occurrence: 1 (05/21/24 1315)    Objective:  Patient Vitals for the past 24 hrs:   BP Temp Temp src Pulse Resp SpO2 Weight   05/21/24 1336 -- -- -- -- 18 -- --   05/21/24 1306 -- -- -- -- 18 -- --   05/21/24 1108 -- -- -- -- -- -- 131.6 kg (290 lb 1.6 oz)   05/21/24 0830 115/73 97.6 °F (36.4 °C) Oral 83 18 96 % --   05/21/24 0714 -- -- -- -- 16 -- --   05/21/24 0644 -- -- -- -- 16 -- --   05/20/24 2245 -- -- -- -- 18 -- --   05/20/24 2202 105/66 98.1 °F (36.7 °C) Oral 93 20 96 % --     Gen: No distress, pleasant.   HEENT: Normocephalic, atraumatic.   CV: Regular rate and rhythm. Extremities warm, well perfused.   Resp: No respiratory distress. CTAB.   Abd: Soft, non-tender.  Ext: RLE in KI.   Neuro: Alert, oriented, appropriately interactive.     Laboratory data: Available via EMR.     Therapy progress:       PT    Supine to Sit: Supervision or touching assistance  Sit to Supine: Supervision or touching assistance   Sit to Stand: Supervision or touching assistance  Chair/Bed to Chair Transfer: Supervision or touching assistance  Car Transfer:    Ambulation 10 ft: Dependent  Ambulation 50 ft:    Ambulation 150 ft:    Stairs - 1 Step: Dependent  Stairs - 4 Step:    Stairs - 12 Step:      OT    Eating: Independent  Oral Hygiene: Setup or clean-up assistance  Bathing: Partial/moderate assistance  Upper Body Dressing: Partial/moderate assistance  Lower Body Dressing: Substantial/maximal assistance  Toilet Transfer: Dependent  Toilet Hygiene: Dependent    Speech Therapy         Body mass index is 41.63 kg/m².    Assessment/Plan:  Functional progress: 25' Brittny using handrail w/

## 2024-05-22 PROCEDURE — 97530 THERAPEUTIC ACTIVITIES: CPT

## 2024-05-22 PROCEDURE — 6370000000 HC RX 637 (ALT 250 FOR IP): Performed by: STUDENT IN AN ORGANIZED HEALTH CARE EDUCATION/TRAINING PROGRAM

## 2024-05-22 PROCEDURE — 1280000000 HC REHAB R&B

## 2024-05-22 PROCEDURE — 97116 GAIT TRAINING THERAPY: CPT

## 2024-05-22 PROCEDURE — 97110 THERAPEUTIC EXERCISES: CPT

## 2024-05-22 PROCEDURE — 97535 SELF CARE MNGMENT TRAINING: CPT

## 2024-05-22 RX ADMIN — ACETAMINOPHEN 1000 MG: 500 TABLET ORAL at 09:18

## 2024-05-22 RX ADMIN — MELATONIN TAB 3 MG 3 MG: 3 TAB at 22:14

## 2024-05-22 RX ADMIN — METHOCARBAMOL TABLETS 1000 MG: 500 TABLET, COATED ORAL at 09:15

## 2024-05-22 RX ADMIN — METHOCARBAMOL TABLETS 1000 MG: 500 TABLET, COATED ORAL at 14:53

## 2024-05-22 RX ADMIN — METHOCARBAMOL TABLETS 1000 MG: 500 TABLET, COATED ORAL at 22:11

## 2024-05-22 RX ADMIN — BACITRACIN: 500 OINTMENT TOPICAL at 10:43

## 2024-05-22 RX ADMIN — METOPROLOL TARTRATE 12.5 MG: 25 TABLET, FILM COATED ORAL at 09:18

## 2024-05-22 RX ADMIN — APIXABAN 5 MG: 5 TABLET, FILM COATED ORAL at 09:19

## 2024-05-22 RX ADMIN — OXYCODONE 5 MG: 5 TABLET ORAL at 06:03

## 2024-05-22 RX ADMIN — ACETAMINOPHEN 1000 MG: 500 TABLET ORAL at 14:53

## 2024-05-22 RX ADMIN — ACETAMINOPHEN 1000 MG: 500 TABLET ORAL at 22:11

## 2024-05-22 RX ADMIN — APIXABAN 5 MG: 5 TABLET, FILM COATED ORAL at 22:11

## 2024-05-22 RX ADMIN — BACITRACIN: 500 OINTMENT TOPICAL at 22:15

## 2024-05-22 RX ADMIN — METOPROLOL TARTRATE 12.5 MG: 25 TABLET, FILM COATED ORAL at 22:12

## 2024-05-22 ASSESSMENT — PAIN - FUNCTIONAL ASSESSMENT
PAIN_FUNCTIONAL_ASSESSMENT: ACTIVITIES ARE NOT PREVENTED

## 2024-05-22 ASSESSMENT — PAIN DESCRIPTION - LOCATION
LOCATION: LEG

## 2024-05-22 ASSESSMENT — PAIN DESCRIPTION - ORIENTATION
ORIENTATION: LEFT

## 2024-05-22 ASSESSMENT — PAIN DESCRIPTION - FREQUENCY: FREQUENCY: INTERMITTENT

## 2024-05-22 ASSESSMENT — PAIN DESCRIPTION - PAIN TYPE: TYPE: ACUTE PAIN

## 2024-05-22 ASSESSMENT — PAIN SCALES - GENERAL
PAINLEVEL_OUTOF10: 1
PAINLEVEL_OUTOF10: 5
PAINLEVEL_OUTOF10: 1
PAINLEVEL_OUTOF10: 4
PAINLEVEL_OUTOF10: 4
PAINLEVEL_OUTOF10: 0

## 2024-05-22 ASSESSMENT — PAIN DESCRIPTION - DESCRIPTORS
DESCRIPTORS: THROBBING

## 2024-05-22 ASSESSMENT — PAIN SCALES - WONG BAKER: WONGBAKER_NUMERICALRESPONSE: NO HURT

## 2024-05-22 NOTE — PROGRESS NOTES
Mobility  Functional Mobility Activity: to/from bathroom  Device Use: hemiwalker  Required Assistance: contact guard assistance  Comment: increased time, no overt LOB noted. Good awareness of esequiel-walker management/placement  Balance :  Static Sitting Balance: good: independent with functional balance in unsupported position  Dynamic Sitting Balance: fair (+): maintains balance at SBA/supervision without use of UE support  Static Standing Balance: fair (-): maintains balance at CGA with use of UE support  Dynamic Standing Balance: fair (-): maintains balance at CGA with use of UE support  Comments:    Other Therapeutic Interventions          Second Session:    Pt in recliner at entry, agreeable to cotx session for trial of stairs. Reports 4/10 pain in L LE. Declined intervention. Pt declined ADL needs.    Pt ambulates with esequiel-walker at CGA 60ft recliner >> chair in hallway and short distance in room at EOS. Increased time, min cues for esequiel-walker management with turns    Stairs: 4x 4-inch stairs completed w/ rest break seated prior to descending- pt requires Nancy progressing to CGA to complete. Increased time, cues for technique    Pt completes 10x step-ups on 6-inch step with L LE, CGA + 2nd for safety  secondary to novelty and height of step.     Pt tolerated session well w/ no new complaints following. Pt requesting to lay in bed at EOS- sit>supine at SBA w/ cues for no use of HR on L side.     Pt left in bed, bed alarm on, call light and needs within reach        Functional Outcomes                                       Cognition  WFL  Orientation:    alert and oriented x 4  Command Following:   WFL     Education  Barriers To Learning: none  Patient Education: patient educated on goals, OT role and benefits, plan of care, precautions, ADL adaptive strategies, proper use of assistive device/equipment, energy conservation, transfer training, discharge recommendations  Learning Assessment:  patient verbalizes  understanding, would benefit from continued reinforcement    Assessment  Activity Tolerance: fair, pain limited   Impairments Requiring Therapeutic Intervention: decreased functional mobility, decreased ADL status, decreased ROM, decreased strength, decreased endurance, decreased balance, decreased IADL, increased pain  Prognosis: fair  Clinical Assessment: Pt is a 43 yr old s/p long-term sustaining multi-trauma. Pt is progressing well towards ADL and mobility goals. Pt able to complete shower level ADL w/ range of SUP to CGA pending task and demonstrates good understanding of KI management and R knee ROM restrictions throughout. Pt was able to ambulate with esequiel-walker to/from bathroom for ADL completing with CGA and increased time. In PM session pt was able to complete 4 4-inch steps with R UE on HR and assistance of 1. Based on pt's progress pt no longer requires co-tx. Pt continues to primarily limited by pain in L LE, L LE weakness, and NWB status of L UE. Pt requires skilled OT services in order to maximize his IND/safety with all occupational pursuits and return to an IND level of function. Con't per POC.   Safety Interventions: patient left in chair, chair alarm in place, call light within reach, and patient at risk for falls    Plan  Frequency: 5 x/week, 90 min/day  Current Treatment Recommendations: strengthening, ROM, balance training, functional mobility training, transfer training, endurance training, patient/caregiver education, ADL/self-care training, IADL training, home management training, pain management, and equipment evaluation/education    Goals  Patient Goals: get back to being active    Short Term Goals:  Time Frame: 1-2 weeks   Patient will complete upper body ADL at stand by assistance - goal met 5/22  Patient will complete lower body ADL at moderate assistance - goal met 5/22  Patient will complete toileting at moderate assistance   Patient will complete functional transfers at minimal assistance
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
## 2024-05-22 NOTE — PLAN OF CARE
Problem: Discharge Planning  Goal: Discharge to home or other facility with appropriate resources  Outcome: Progressing     Problem: ABCDS Injury Assessment  Goal: Absence of physical injury  Outcome: Progressing     Problem: Pain  Goal: Verbalizes/displays adequate comfort level or baseline comfort level  Outcome: Progressing     Problem: Skin/Tissue Integrity  Goal: Absence of new skin breakdown  Description: 1.  Monitor for areas of redness and/or skin breakdown  2.  Assess vascular access sites hourly  3.  Every 4-6 hours minimum:  Change oxygen saturation probe site  4.  Every 4-6 hours:  If on nasal continuous positive airway pressure, respiratory therapy assess nares and determine need for appliance change or resting period.  Outcome: Progressing     Problem: Safety - Adult  Goal: Free from fall injury  Outcome: Progressing

## 2024-05-22 NOTE — PROGRESS NOTES
Aly Bernard  5/22/2024  [id number]    Chief Complaint: Closed displaced comminuted fracture of shaft of left tibia, initial encounter    Subjective:   No acute events overnight.     Denies any fevers, chills, chest pain, dyspnea. Pain controlled on current regimen. Was very pleased to have a shower today with OT assistance.     Last BM: Stool Occurrence: 1 (05/22/24 0555)    Objective:  Patient Vitals for the past 24 hrs:   BP Temp Temp src Pulse Resp SpO2   05/22/24 0915 111/75 98.3 °F (36.8 °C) Oral 99 18 95 %   05/22/24 0633 -- -- -- -- 18 --   05/22/24 0603 -- -- -- -- 18 --   05/21/24 2356 -- -- -- -- 18 --   05/21/24 2315 116/65 98.1 °F (36.7 °C) Oral 91 18 95 %     Gen: No distress, pleasant. Visiting with a friend.   HEENT: Normocephalic, atraumatic.   CV: Regular rate and rhythm. Extremities warm, well perfused.   Resp: No respiratory distress. CTAB.   Abd: Soft, non-tender.  Ext: RLE in KI.   Neuro: Alert, oriented, appropriately interactive.     Laboratory data: Available via EMR.     Therapy progress:       PT    Supine to Sit: Independent  Sit to Supine: Supervision or touching assistance   Sit to Stand: Supervision or touching assistance  Chair/Bed to Chair Transfer: Supervision or touching assistance  Car Transfer:    Ambulation 10 ft: Partial/moderate assistance  Ambulation 50 ft:    Ambulation 150 ft:    Stairs - 1 Step: Dependent  Stairs - 4 Step:    Stairs - 12 Step:      OT    Eating: Independent  Oral Hygiene: Setup or clean-up assistance  Bathing: Supervision or touching assistance  Upper Body Dressing: Setup or clean-up assistance  Lower Body Dressing: Supervision or touching assistance  Toilet Transfer: Dependent  Toilet Hygiene: Dependent    Speech Therapy         Body mass index is 41.63 kg/m².    Assessment/Plan:  Functional progress: 20' Brittny w/ hemiwalker.  This patient continues to require an ARU level of care from all disciplines to address the following issues:    #. Left comminuted

## 2024-05-23 LAB
ANION GAP SERPL CALCULATED.3IONS-SCNC: 12 MMOL/L (ref 3–16)
BASOPHILS # BLD: 0.1 K/UL (ref 0–0.2)
BASOPHILS NFR BLD: 1.2 %
BUN SERPL-MCNC: 18 MG/DL (ref 7–20)
CALCIUM SERPL-MCNC: 9.4 MG/DL (ref 8.3–10.6)
CHLORIDE SERPL-SCNC: 103 MMOL/L (ref 99–110)
CO2 SERPL-SCNC: 24 MMOL/L (ref 21–32)
CREAT SERPL-MCNC: 1.1 MG/DL (ref 0.9–1.3)
DEPRECATED RDW RBC AUTO: 12.9 % (ref 12.4–15.4)
EOSINOPHIL # BLD: 0.2 K/UL (ref 0–0.6)
EOSINOPHIL NFR BLD: 2.1 %
GFR SERPLBLD CREATININE-BSD FMLA CKD-EPI: 85 ML/MIN/{1.73_M2}
GLUCOSE SERPL-MCNC: 120 MG/DL (ref 70–99)
HCT VFR BLD AUTO: 40.1 % (ref 40.5–52.5)
HGB BLD-MCNC: 13.3 G/DL (ref 13.5–17.5)
LYMPHOCYTES # BLD: 2.5 K/UL (ref 1–5.1)
LYMPHOCYTES NFR BLD: 30.4 %
MCH RBC QN AUTO: 30.1 PG (ref 26–34)
MCHC RBC AUTO-ENTMCNC: 33.1 G/DL (ref 31–36)
MCV RBC AUTO: 91.1 FL (ref 80–100)
MONOCYTES # BLD: 0.7 K/UL (ref 0–1.3)
MONOCYTES NFR BLD: 8 %
NEUTROPHILS # BLD: 4.9 K/UL (ref 1.7–7.7)
NEUTROPHILS NFR BLD: 58.3 %
PLATELET # BLD AUTO: 493 K/UL (ref 135–450)
PMV BLD AUTO: 8.2 FL (ref 5–10.5)
POTASSIUM SERPL-SCNC: 4.2 MMOL/L (ref 3.5–5.1)
RBC # BLD AUTO: 4.41 M/UL (ref 4.2–5.9)
SODIUM SERPL-SCNC: 139 MMOL/L (ref 136–145)
WBC # BLD AUTO: 8.4 K/UL (ref 4–11)

## 2024-05-23 PROCEDURE — 80048 BASIC METABOLIC PNL TOTAL CA: CPT

## 2024-05-23 PROCEDURE — 97535 SELF CARE MNGMENT TRAINING: CPT

## 2024-05-23 PROCEDURE — 85025 COMPLETE CBC W/AUTO DIFF WBC: CPT

## 2024-05-23 PROCEDURE — 97116 GAIT TRAINING THERAPY: CPT

## 2024-05-23 PROCEDURE — 97110 THERAPEUTIC EXERCISES: CPT

## 2024-05-23 PROCEDURE — 6370000000 HC RX 637 (ALT 250 FOR IP): Performed by: STUDENT IN AN ORGANIZED HEALTH CARE EDUCATION/TRAINING PROGRAM

## 2024-05-23 PROCEDURE — 97530 THERAPEUTIC ACTIVITIES: CPT

## 2024-05-23 PROCEDURE — 1280000000 HC REHAB R&B

## 2024-05-23 RX ADMIN — METHOCARBAMOL TABLETS 1000 MG: 500 TABLET, COATED ORAL at 09:00

## 2024-05-23 RX ADMIN — BACITRACIN: 500 OINTMENT TOPICAL at 21:27

## 2024-05-23 RX ADMIN — ACETAMINOPHEN 1000 MG: 500 TABLET ORAL at 14:47

## 2024-05-23 RX ADMIN — MELATONIN TAB 3 MG 3 MG: 3 TAB at 21:26

## 2024-05-23 RX ADMIN — METOPROLOL TARTRATE 12.5 MG: 25 TABLET, FILM COATED ORAL at 08:59

## 2024-05-23 RX ADMIN — BACITRACIN: 500 OINTMENT TOPICAL at 09:01

## 2024-05-23 RX ADMIN — OXYCODONE 5 MG: 5 TABLET ORAL at 10:13

## 2024-05-23 RX ADMIN — ACETAMINOPHEN 1000 MG: 500 TABLET ORAL at 21:25

## 2024-05-23 RX ADMIN — APIXABAN 5 MG: 5 TABLET, FILM COATED ORAL at 09:00

## 2024-05-23 RX ADMIN — ACETAMINOPHEN 1000 MG: 500 TABLET ORAL at 09:00

## 2024-05-23 RX ADMIN — APIXABAN 5 MG: 5 TABLET, FILM COATED ORAL at 21:26

## 2024-05-23 RX ADMIN — METOPROLOL TARTRATE 12.5 MG: 25 TABLET, FILM COATED ORAL at 21:26

## 2024-05-23 ASSESSMENT — PAIN SCALES - GENERAL
PAINLEVEL_OUTOF10: 5
PAINLEVEL_OUTOF10: 7
PAINLEVEL_OUTOF10: 1
PAINLEVEL_OUTOF10: 3
PAINLEVEL_OUTOF10: 2

## 2024-05-23 ASSESSMENT — PAIN DESCRIPTION - ORIENTATION
ORIENTATION: LEFT

## 2024-05-23 ASSESSMENT — PAIN DESCRIPTION - LOCATION
LOCATION: LEG
LOCATION: ARM
LOCATION: LEG

## 2024-05-23 ASSESSMENT — PAIN - FUNCTIONAL ASSESSMENT
PAIN_FUNCTIONAL_ASSESSMENT: ACTIVITIES ARE NOT PREVENTED

## 2024-05-23 ASSESSMENT — PAIN DESCRIPTION - DESCRIPTORS
DESCRIPTORS: THROBBING
DESCRIPTORS: THROBBING
DESCRIPTORS: DISCOMFORT

## 2024-05-23 ASSESSMENT — PAIN DESCRIPTION - PAIN TYPE: TYPE: ACUTE PAIN

## 2024-05-23 ASSESSMENT — PAIN DESCRIPTION - ONSET: ONSET: ON-GOING

## 2024-05-23 ASSESSMENT — PAIN DESCRIPTION - FREQUENCY: FREQUENCY: INTERMITTENT

## 2024-05-23 NOTE — PROGRESS NOTES
Shift assessment complete, VSS, A&Ox4. Morning medications administered per MAR. Patient rates pain as a 7/10, pain medication administered per MAR. Patient educated on plan of care for today and verbalizes understanding. All questions and concerns answered/addressed. Patient states no further needs at this time. Call light and personal belongings within reach.

## 2024-05-23 NOTE — PROGRESS NOTES
functional mobility.  Supine LE exercises completed in preparation for mobility including: (B) SLR and (L) heel slide.  Patient assisted LB dressing due to time constraints completed at EOB with sit to stand from EOB without device at [facility].  Seated stepper with (L) LE and (R) UE: L1 x 4 min to improve (L) LE strength and ROM.  Seated (L) LE heel slides completed 1 x 10.  Toilet transfer completed as documented above with use of horizontal grab bar.  Pt requires [facility] to maintain standing balance during clothing management.  Hand hygiene at sink completed at w/c level.  Stand pivot transfer completed w/c => recliner at [facility].       2nd Session:    Pt seated in recliner upon arrival, reporting 3/10 pain, agreeable to PT session.  Sit <=> stand transfers completed throughout session at [facility] with completion to esequiel-walker and without device for stand pivot transfers.  Pt ambulates 30 ft + 75 ft within session using esequiel-walker at [facility] with same mechanics as first session.  Car transfer completed with use of esequiel-walker at [facility], VC for techniques to allow LE clearance into vehicle with knee immobilizer.  Standing (B) LE therex completed 1 x 10 ea: 3-way hip, heel raises.  Seated (L) LE therex completed 1 x 10: marching, LAQ.  Stand pivot transfer completed w/c => recliner at [facility].  Pt remains up in recliner with chair alarm and call light within reach.  No new complaints following session.    Functional Outcomes                 Cognition  WFL  Orientation:    alert and oriented x 4  Command Following:   WFL    Education  Barriers To Learning: none  Patient Education: patient educated on goals, PT role and benefits, plan of care, precautions, weight-bearing education, general safety, functional mobility training, proper use of assistive device/equipment, energy conservation, transfer training  Learning Assessment:  patient verbalizes and demonstrates understanding    Assessment  Activity Tolerance: Improving with anticipated  home with esequiel-walker    Long Term Goals:  To be completed in: 3 weeks  Patient will complete bed mobility at modified independent - goal met 5/22/2024  Patient will complete transfers at [facility]   Patient will ambulate 150 ft with use of LRAD at [facility]  Patient will ascend/descend 1 + 1 stairs with (L) ascending handrail at modified independent  Patient will ascend/descend 4 stairs with (B) handrails at modified independent  Patient will complete car transfer at modified independent    Above goals reviewed on 5/23/2024.  All goals are ongoing at this time unless indicated above.      Therapy Session Time      Individual Group Co-treatment   Time In 0730     Time Out 0840     Minutes 70         Second Session Therapy Time:   Individual Group Co-treatment   Time In 1115      Time Out 1200      Minutes 45        Total Treatment Minutes: 115 minutes    Electronically Signed By: Irwin Mckee PT
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
## 2024-05-23 NOTE — PROGRESS NOTES
Aly Bernard  5/23/2024  [id number]    Chief Complaint: Closed displaced comminuted fracture of shaft of left tibia, initial encounter    Subjective:   No acute events overnight.     Patient reports that he is doing well today. Denies any fevers, chills, chest pain, dyspnea. Working hard to minimize oxycodone use, wants to be off at time of discharge.     Last BM: Stool Occurrence: 1 (05/22/24 0555)    Objective:  Patient Vitals for the past 24 hrs:   BP Temp Temp src Pulse Resp SpO2 Height   05/23/24 1137 -- -- -- -- -- -- 1.778 m (5' 10\")   05/23/24 1043 -- -- -- -- 18 -- --   05/23/24 1013 -- -- -- -- 18 -- --   05/23/24 0845 122/82 97.5 °F (36.4 °C) Tympanic (!) 116 18 99 % --   05/22/24 2211 116/70 -- -- 83 -- -- --   05/22/24 2113 112/71 98.2 °F (36.8 °C) Oral 91 16 96 % --     Gen: No distress, pleasant. Visiting with a friend.   HEENT: Normocephalic, atraumatic.   CV: Regular rate and rhythm. Extremities warm, well perfused.   Resp: No respiratory distress. CTAB.   Abd: Soft, non-tender.  Ext: RLE in KI.   Neuro: Alert, oriented, appropriately interactive.     Laboratory data: Available via EMR.     Therapy progress:       PT    Supine to Sit: Independent  Sit to Supine: Independent   Sit to Stand: Supervision or touching assistance  Chair/Bed to Chair Transfer: Supervision or touching assistance  Car Transfer: Supervision or touching assistance  Ambulation 10 ft: Supervision or touching assistance  Ambulation 50 ft: Supervision or touching assistance  Ambulation 150 ft: Supervision or touching assistance  Stairs - 1 Step: Supervision or touching assistance  Stairs - 4 Step: Supervision or touching assistance  Stairs - 12 Step:      OT    Eating: Independent  Oral Hygiene: Setup or clean-up assistance  Bathing: Supervision or touching assistance  Upper Body Dressing: Setup or clean-up assistance  Lower Body Dressing: Supervision or touching assistance  Toilet Transfer: Supervision or touching  assistance  Toilet Hygiene: Supervision or touching assistance    Speech Therapy         Body mass index is 41.63 kg/m².    Assessment/Plan:  Functional progress: 150' Brittny w/ hemiwalker.  This patient continues to require an ARU level of care from all disciplines to address the following issues:    #. Left comminuted and displaced left third metacarpal base fracture, minimally displaced left fourth proximal phalanx fracture, left radioulnar dislocation  - managed non-op  - NWB LUE  - Maintain splint  - BMP WNL, prior hyponatremia resolved.  - CBC WNL, except very mild normocytic anemia likely from scant bleeding at right knee.     #. Left mid-shaft tibial fracture  - s/p IM nail on 5/12  - WBAT     #. Right knee soft tissue injuries  - s/p I&D w/ wound closure on 5/12  - WBAT in knee immobilizer.      #. Grade 1-2 B CVI right vertebral artery  #. Focal short dissection of the left vertebral artery  - stable on repeat CTA at [facility]  - Eliquis     #. SVT  - s/p adenosine on 5/12  - TTE on 5/12 with normal EF, no RWMA  - metoprolol 12.5 mg BID     #. Diffuse abrasions  - Bacitracin BID     #. Mild TBI  - (+) post-traumatic amnesia, unknown duration of LOC  - Hx of prior TBI, mood disorder  - No post-concussive symptoms at present. Cognitive eval WFL at [facility].     #. EtOH use disorder  - Pt endorses insight into problematic drinking habits  - Interested in community resources for sobriety, appreciate SW/CM assistance in providing patient with this     Chronic Conditions  - HLD: Previously on fenofibrate (has not taken in over 1 year). Not recommended at discharge from [facility]. Follow-up with PCP to discuss at outpatient.   - Morbid obesity     CODE: Full Code  Diet: ADULT DIET; Regular; Double Protein Portions  ADULT ORAL NUTRITION SUPPLEMENT; Breakfast, Dinner; Low Calorie/High Protein Oral Supplement  Bowels: MiraLAX daily.  Senna-docusate daily as needed for constipation.   Bladder: Per protocol   Sleep: Melatonin 3 mg

## 2024-05-23 NOTE — PLAN OF CARE
Problem: Discharge Planning  Goal: Discharge to home or other facility with appropriate resources  Outcome: Progressing  Flowsheets (Taken 5/23/2024 1013)  Discharge to home or other facility with appropriate resources:   Identify barriers to discharge with patient and caregiver   Arrange for needed discharge resources and transportation as appropriate   Identify discharge learning needs (meds, wound care, etc)   Refer to discharge planning if patient needs post-hospital services based on physician order or complex needs related to functional status, cognitive ability or social support system     Problem: ABCDS Injury Assessment  Goal: Absence of physical injury  Outcome: Progressing     Problem: Pain  Goal: Verbalizes/displays adequate comfort level or baseline comfort level  Outcome: Progressing     Problem: Skin/Tissue Integrity  Goal: Absence of new skin breakdown  Description: 1.  Monitor for areas of redness and/or skin breakdown  2.  Assess vascular access sites hourly  3.  Every 4-6 hours minimum:  Change oxygen saturation probe site  4.  Every 4-6 hours:  If on nasal continuous positive airway pressure, respiratory therapy assess nares and determine need for appliance change or resting period.  Outcome: Progressing     Problem: Safety - Adult  Goal: Free from fall injury  Outcome: Progressing

## 2024-05-24 PROCEDURE — 97110 THERAPEUTIC EXERCISES: CPT

## 2024-05-24 PROCEDURE — 97112 NEUROMUSCULAR REEDUCATION: CPT

## 2024-05-24 PROCEDURE — 97535 SELF CARE MNGMENT TRAINING: CPT

## 2024-05-24 PROCEDURE — 97116 GAIT TRAINING THERAPY: CPT

## 2024-05-24 PROCEDURE — 97530 THERAPEUTIC ACTIVITIES: CPT

## 2024-05-24 PROCEDURE — 1280000000 HC REHAB R&B

## 2024-05-24 PROCEDURE — 6370000000 HC RX 637 (ALT 250 FOR IP): Performed by: STUDENT IN AN ORGANIZED HEALTH CARE EDUCATION/TRAINING PROGRAM

## 2024-05-24 RX ADMIN — METOPROLOL TARTRATE 12.5 MG: 25 TABLET, FILM COATED ORAL at 09:23

## 2024-05-24 RX ADMIN — OXYCODONE 5 MG: 5 TABLET ORAL at 05:50

## 2024-05-24 RX ADMIN — ACETAMINOPHEN 1000 MG: 500 TABLET ORAL at 09:24

## 2024-05-24 RX ADMIN — METOPROLOL TARTRATE 12.5 MG: 25 TABLET, FILM COATED ORAL at 20:57

## 2024-05-24 RX ADMIN — BACITRACIN: 500 OINTMENT TOPICAL at 12:04

## 2024-05-24 RX ADMIN — BACITRACIN: 500 OINTMENT TOPICAL at 20:58

## 2024-05-24 RX ADMIN — APIXABAN 5 MG: 5 TABLET, FILM COATED ORAL at 09:23

## 2024-05-24 RX ADMIN — ACETAMINOPHEN 1000 MG: 500 TABLET ORAL at 20:56

## 2024-05-24 RX ADMIN — APIXABAN 5 MG: 5 TABLET, FILM COATED ORAL at 20:57

## 2024-05-24 RX ADMIN — MELATONIN TAB 3 MG 3 MG: 3 TAB at 20:57

## 2024-05-24 RX ADMIN — ACETAMINOPHEN 1000 MG: 500 TABLET ORAL at 16:20

## 2024-05-24 ASSESSMENT — PAIN SCALES - GENERAL
PAINLEVEL_OUTOF10: 3
PAINLEVEL_OUTOF10: 0
PAINLEVEL_OUTOF10: 4
PAINLEVEL_OUTOF10: 4
PAINLEVEL_OUTOF10: 2

## 2024-05-24 ASSESSMENT — PAIN DESCRIPTION - DESCRIPTORS
DESCRIPTORS: ACHING;DISCOMFORT
DESCRIPTORS: ACHING

## 2024-05-24 ASSESSMENT — PAIN DESCRIPTION - LOCATION
LOCATION: LEG
LOCATION: LEG

## 2024-05-24 ASSESSMENT — PAIN DESCRIPTION - ORIENTATION
ORIENTATION: LEFT;LOWER
ORIENTATION: RIGHT;LEFT

## 2024-05-24 NOTE — PLAN OF CARE
Problem: Discharge Planning  Goal: Discharge to home or other facility with appropriate resources  Outcome: Progressing  Flowsheets (Taken 5/24/2024 9691)  Discharge to home or other facility with appropriate resources:   Identify barriers to discharge with patient and caregiver   Identify discharge learning needs (meds, wound care, etc)     Problem: ABCDS Injury Assessment  Goal: Absence of physical injury  Outcome: Progressing     Problem: Pain  Goal: Verbalizes/displays adequate comfort level or baseline comfort level  Outcome: Progressing     Problem: Skin/Tissue Integrity  Goal: Absence of new skin breakdown  Description: 1.  Monitor for areas of redness and/or skin breakdown  2.  Assess vascular access sites hourly  3.  Every 4-6 hours minimum:  Change oxygen saturation probe site  4.  Every 4-6 hours:  If on nasal continuous positive airway pressure, respiratory therapy assess nares and determine need for appliance change or resting period.  Outcome: Progressing     Problem: Safety - Adult  Goal: Free from fall injury  Outcome: Progressing
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
## 2024-05-24 NOTE — PLAN OF CARE
Problem: Pain  Goal: Verbalizes/displays adequate comfort level or baseline comfort level  5/23/2024 2229 by Kalyani Wolfe, RN  Outcome: Progressing     Problem: Skin/Tissue Integrity  Goal: Absence of new skin breakdown  Description: 1.  Monitor for areas of redness and/or skin breakdown  2.  Assess vascular access sites hourly  3.  Every 4-6 hours minimum:  Change oxygen saturation probe site  4.  Every 4-6 hours:  If on nasal continuous positive airway pressure, respiratory therapy assess nares and determine need for appliance change or resting period.  5/23/2024 2229 by Kalyani Wolfe, RN  Outcome: Progressing     Problem: Safety - Adult  Goal: Free from fall injury  5/23/2024 2229 by Kalyani Wolfe, RN  Outcome: Progressing  5/23/2024 1457 by Diane Cruz, RN  Outcome: Progressing
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
## 2024-05-24 NOTE — PROGRESS NOTES
to require an ARU level of care from all disciplines to address the following issues:    #. Left comminuted and displaced left third metacarpal base fracture, minimally displaced left fourth proximal phalanx fracture, left radioulnar dislocation  - managed non-op  - NWB LUE  - Maintain splint     #. Left mid-shaft tibial fracture  - s/p IM nail on 5/12  - WBAT     #. Right knee soft tissue injuries  - s/p I&D w/ wound closure on 5/12  - WBAT in knee immobilizer.      #. Grade 1-2 B CVI right vertebral artery  #. 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 SVT  - s/p adenosine on 5/12  - TTE on 5/12 with normal EF, no RWMA  - metoprolol 12.5 mg BID  - Mildly increased heart rate noted today, asymptomatic.  If persists will consider obtaining repeat EKG to confirm sinus rhythm, increasing metoprolol versus consulting with Cardiology. Low level of concern for alternative diagnoses such as DVT/PE, given patient's anticoagulation status and lack of symptoms.     #. Diffuse abrasions  - Bacitracin BID     #. Mild TBI  - (+) post-traumatic amnesia, unknown duration of LOC  - Hx of prior TBI, mood disorder  - No post-concussive symptoms at present. Cognitive eval WFL at [facility].     #. EtOH use disorder  - Pt endorses insight into problematic drinking habits  - Interested in community resources for sobriety, appreciate SW/CM assistance in providing patient with this     Chronic Conditions  - HLD: Previously on fenofibrate (has not taken in over 1 year). Not recommended at discharge from [facility]. Follow-up with PCP to discuss at outpatient.   - Morbid obesity     CODE: Full Code  Diet: ADULT DIET; Regular; Double Protein Portions  ADULT ORAL NUTRITION SUPPLEMENT; Breakfast, Dinner; Low Calorie/High Protein Oral Supplement  Bowels: MiraLAX daily.  Senna-docusate daily as needed for constipation.   Bladder: Per protocol   Sleep: Melatonin 3 mg every evening  Pain: Tylenol 1 g  3 times daily scheduled, methocarbamol 1 g 3 times daily for muscle spasm, lidocaine patch daily, oxycodone 5 mg every 4 hours as needed for moderate to severe pain  DVT PPx: Full anticoagulated  Follow-ups: Ortho Hand, Ortho, Cardiology, PCP  ELOS: 18 days    Ryan Solitario MD 5/24/2024, 2:32 PM    * This document was created using dictation software.  While all precautions were taken to ensure accuracy, errors may have occurred.  Please disregard any typographical errors.

## 2024-05-25 PROCEDURE — 6370000000 HC RX 637 (ALT 250 FOR IP): Performed by: STUDENT IN AN ORGANIZED HEALTH CARE EDUCATION/TRAINING PROGRAM

## 2024-05-25 PROCEDURE — 1280000000 HC REHAB R&B

## 2024-05-25 RX ADMIN — BACITRACIN: 500 OINTMENT TOPICAL at 13:06

## 2024-05-25 RX ADMIN — METOPROLOL TARTRATE 12.5 MG: 25 TABLET, FILM COATED ORAL at 09:02

## 2024-05-25 RX ADMIN — APIXABAN 5 MG: 5 TABLET, FILM COATED ORAL at 09:01

## 2024-05-25 RX ADMIN — MELATONIN TAB 3 MG 3 MG: 3 TAB at 21:23

## 2024-05-25 RX ADMIN — METOPROLOL TARTRATE 12.5 MG: 25 TABLET, FILM COATED ORAL at 21:23

## 2024-05-25 RX ADMIN — ACETAMINOPHEN 1000 MG: 500 TABLET ORAL at 09:02

## 2024-05-25 RX ADMIN — APIXABAN 5 MG: 5 TABLET, FILM COATED ORAL at 21:23

## 2024-05-25 RX ADMIN — BACITRACIN: 500 OINTMENT TOPICAL at 21:22

## 2024-05-25 RX ADMIN — ACETAMINOPHEN 1000 MG: 500 TABLET ORAL at 21:22

## 2024-05-25 ASSESSMENT — PAIN SCALES - GENERAL
PAINLEVEL_OUTOF10: 0
PAINLEVEL_OUTOF10: 0

## 2024-05-25 NOTE — PROGRESS NOTES
Aly Bernard  5/25/2024  [id number]    Chief Complaint: Closed displaced comminuted fracture of shaft of left tibia, initial encounter    Subjective:   Family at bedside. Patient states that he is feeling well and denies any new onset complaints.    Patient reports he is doing well today.  Denies any fevers, chills, chest pain, dyspnea, palpitations.    Last BM: Stool Occurrence: 1 (05/25/24 0718)    Objective:  Patient Vitals for the past 24 hrs:   BP Temp Temp src Pulse Resp SpO2   05/25/24 1259 -- -- -- 96 -- --   05/25/24 0745 101/65 98.1 °F (36.7 °C) Oral 96 17 98 %   05/24/24 2045 109/66 98.2 °F (36.8 °C) Oral 88 16 96 %     Gen: No distress, pleasant. Visiting with a friend.   HEENT: Normocephalic, atraumatic.   CV: Regular rate and rhythm. Extremities warm, well perfused.   Resp: No respiratory distress. CTAB.   Abd: Soft, non-tender.  Ext: RLE in KI.   Neuro: Alert, oriented, appropriately interactive.     Laboratory data: Available via EMR.     Therapy progress:       PT    Supine to Sit: Independent  Sit to Supine: Independent   Sit to Stand: Supervision or touching assistance  Chair/Bed to Chair Transfer: Supervision or touching assistance  Car Transfer: Supervision or touching assistance  Ambulation 10 ft: Supervision or touching assistance  Ambulation 50 ft: Supervision or touching assistance  Ambulation 150 ft: Supervision or touching assistance  Stairs - 1 Step: Supervision or touching assistance  Stairs - 4 Step: Supervision or touching assistance  Stairs - 12 Step:      OT    Eating: Independent  Oral Hygiene: Setup or clean-up assistance  Bathing: Supervision or touching assistance  Upper Body Dressing: Independent  Lower Body Dressing: Supervision or touching assistance  Toilet Transfer: Supervision or touching assistance  Toilet Hygiene: Supervision or touching assistance    Speech Therapy         Body mass index is 41.63 kg/m².    Assessment/Plan:  Functional progress: 150' Brittny w/  Senna-docusate daily as needed for constipation.   Bladder: Per protocol   Sleep: Melatonin 3 mg every evening  Pain: Tylenol 1 g 3 times daily scheduled, methocarbamol 1 g 3 times daily for muscle spasm, lidocaine patch daily, oxycodone 5 mg every 4 hours as needed for moderate to severe pain  DVT PPx: Full anticoagulated  Follow-ups: Ortho Hand, Ortho, Cardiology, PCP  ELOS: 18 days    Francisco Javier Milian DO 5/25/2024, 5:17 PM    * This document was created using dictation software.  While all precautions were taken to ensure accuracy, errors may have occurred.  Please disregard any typographical errors.

## 2024-05-25 NOTE — PLAN OF CARE
Problem: Discharge Planning  Goal: Discharge to home or other facility with appropriate resources  Outcome: Progressing     Problem: Pain  Goal: Verbalizes/displays adequate comfort level or baseline comfort level  Outcome: Progressing     Problem: Skin/Tissue Integrity  Goal: Absence of new skin breakdown  Description: 1.  Monitor for areas of redness and/or skin breakdown  2.  Assess vascular access sites hourly  3.  Every 4-6 hours minimum:  Change oxygen saturation probe site  4.  Every 4-6 hours:  If on nasal continuous positive airway pressure, respiratory therapy assess nares and determine need for appliance change or resting period.  5/24/2024 2245 by Kalyani Wolfe, RN  Outcome: Progressing     Problem: Safety - Adult  Goal: Free from fall injury  5/24/2024 2245 by Kalyani Wolfe, RN  Outcome: Progressing

## 2024-05-26 VITALS
BODY MASS INDEX: 41.53 KG/M2 | HEART RATE: 93 BPM | OXYGEN SATURATION: 94 % | SYSTOLIC BLOOD PRESSURE: 96 MMHG | RESPIRATION RATE: 16 BRPM | WEIGHT: 290.1 LBS | DIASTOLIC BLOOD PRESSURE: 60 MMHG | TEMPERATURE: 98.2 F | HEIGHT: 70 IN

## 2024-05-26 PROCEDURE — 1280000000 HC REHAB R&B

## 2024-05-26 PROCEDURE — 6370000000 HC RX 637 (ALT 250 FOR IP): Performed by: STUDENT IN AN ORGANIZED HEALTH CARE EDUCATION/TRAINING PROGRAM

## 2024-05-26 RX ADMIN — METOPROLOL TARTRATE 12.5 MG: 25 TABLET, FILM COATED ORAL at 20:54

## 2024-05-26 RX ADMIN — BACITRACIN: 500 OINTMENT TOPICAL at 20:55

## 2024-05-26 RX ADMIN — ACETAMINOPHEN 1000 MG: 500 TABLET ORAL at 20:54

## 2024-05-26 RX ADMIN — APIXABAN 5 MG: 5 TABLET, FILM COATED ORAL at 09:00

## 2024-05-26 RX ADMIN — BACITRACIN: 500 OINTMENT TOPICAL at 09:00

## 2024-05-26 RX ADMIN — METOPROLOL TARTRATE 12.5 MG: 25 TABLET, FILM COATED ORAL at 09:01

## 2024-05-26 RX ADMIN — ACETAMINOPHEN 1000 MG: 500 TABLET ORAL at 08:59

## 2024-05-26 RX ADMIN — OXYCODONE 5 MG: 5 TABLET ORAL at 20:54

## 2024-05-26 RX ADMIN — MELATONIN TAB 3 MG 3 MG: 3 TAB at 20:54

## 2024-05-26 RX ADMIN — APIXABAN 5 MG: 5 TABLET, FILM COATED ORAL at 20:54

## 2024-05-26 ASSESSMENT — PAIN DESCRIPTION - DESCRIPTORS: DESCRIPTORS: DISCOMFORT

## 2024-05-26 ASSESSMENT — PAIN DESCRIPTION - LOCATION: LOCATION: LEG

## 2024-05-26 ASSESSMENT — PAIN DESCRIPTION - FREQUENCY: FREQUENCY: INTERMITTENT

## 2024-05-26 ASSESSMENT — PAIN DESCRIPTION - ONSET: ONSET: ON-GOING

## 2024-05-26 ASSESSMENT — PAIN DESCRIPTION - PAIN TYPE: TYPE: ACUTE PAIN

## 2024-05-26 ASSESSMENT — PAIN SCALES - GENERAL
PAINLEVEL_OUTOF10: 3
PAINLEVEL_OUTOF10: 0
PAINLEVEL_OUTOF10: 5

## 2024-05-26 ASSESSMENT — PAIN - FUNCTIONAL ASSESSMENT: PAIN_FUNCTIONAL_ASSESSMENT: ACTIVITIES ARE NOT PREVENTED

## 2024-05-26 ASSESSMENT — PAIN DESCRIPTION - ORIENTATION: ORIENTATION: LEFT;LOWER

## 2024-05-26 NOTE — PATIENT CARE CONFERENCE
[facility] Inpatient Rehabilitation Department  Weekly Team Conference Note    Patient Name: Aly Bernard      MRN: [id number]  : 1980  (43 y.o.) Gender: male     The team conference for this patient was held on 2024 at 11:00 am and led by:  Ryan Solitario MD     CASE MANAGEMENT:  Assessment: Patient is a 43 year old male who admitted to ARU on 2024 with the admitting diagnosis of Major multiple fractures - left tibia, left 3rd metacarpal, left 4th proximal phalanx, left radioulnar dislocation .  Patient resides at home with his fiance and mother.  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 Patient would benefit from skilled ARU PT/OT/SLP to promote increased safety and independence in order to return to his prior level of functioning. Patient anticipates discharging to home with recommended services and DME.     PHYSICAL THERAPY    Current Status:  Bed Mobility:  Supine to sit: independent level.  Transfers:  Sit to stand transfer: modified independent, (from varied height surfaces - standing to crutch and no device)  Stand to sit transfer: modified independent  Stand pivot transfer: modified independent, (completed seated stepper => w/c without device)   Stand step transfer: stand by assistance (multiple completions with (R) axillary crutch)  Comments: Continues to have antalgic weight acceptance through (L) LE during stand step transfers  Ambulation   Surface:level surface  Assistive Device: axillary crutches  Assistance: stand by assistance  Distance: 175 ft + 50 ft   Gait Mechanics: Step to mechanics progressing to partial reciprocal pattern by conclusion of session.  Increased lateral lean to (R).  Reduced but improving (L) SLS resulting in reduced (R) foot clearance and stride length.    Comments:  Completes ambulation with shoes donned on this date.  Self reports increased difficulty with use.    Stair Mobility:   Number of Steps: 6  Step Height: 6

## 2024-05-26 NOTE — PROGRESS NOTES
Morning assessment completed, bp on a softer side, schedule meds given, The care plan and education has been reviewed and mutually agreed upon with the patient. Pt remains free from falls. Fall precautions in place--bed in lowest position, call light within reach, bed alarm in use. Pt aware to call for assistance before getting up.

## 2024-05-26 NOTE — PLAN OF CARE
Problem: Discharge Planning  Goal: Discharge to home or other facility with appropriate resources  Outcome: Progressing     Problem: ABCDS Injury Assessment  Goal: Absence of physical injury  Outcome: Progressing     Problem: Pain  Goal: Verbalizes/displays adequate comfort level or baseline comfort level  5/26/2024 0937 by Sarika Dueñas RN  Outcome: Progressing

## 2024-05-27 LAB
ANION GAP SERPL CALCULATED.3IONS-SCNC: 11 MMOL/L (ref 3–16)
BASOPHILS # BLD: 0.1 K/UL (ref 0–0.2)
BASOPHILS NFR BLD: 1.9 %
BUN SERPL-MCNC: 17 MG/DL (ref 7–20)
CALCIUM SERPL-MCNC: 9.5 MG/DL (ref 8.3–10.6)
CHLORIDE SERPL-SCNC: 105 MMOL/L (ref 99–110)
CO2 SERPL-SCNC: 23 MMOL/L (ref 21–32)
CREAT SERPL-MCNC: 1.1 MG/DL (ref 0.9–1.3)
DEPRECATED RDW RBC AUTO: 12.9 % (ref 12.4–15.4)
EOSINOPHIL # BLD: 0.2 K/UL (ref 0–0.6)
EOSINOPHIL NFR BLD: 3.4 %
GFR SERPLBLD CREATININE-BSD FMLA CKD-EPI: 85 ML/MIN/{1.73_M2}
GLUCOSE SERPL-MCNC: 119 MG/DL (ref 70–99)
HCT VFR BLD AUTO: 40 % (ref 40.5–52.5)
HGB BLD-MCNC: 13.7 G/DL (ref 13.5–17.5)
LYMPHOCYTES # BLD: 1.8 K/UL (ref 1–5.1)
LYMPHOCYTES NFR BLD: 34.7 %
MCH RBC QN AUTO: 31.5 PG (ref 26–34)
MCHC RBC AUTO-ENTMCNC: 34.3 G/DL (ref 31–36)
MCV RBC AUTO: 91.7 FL (ref 80–100)
MONOCYTES # BLD: 0.5 K/UL (ref 0–1.3)
MONOCYTES NFR BLD: 10.5 %
NEUTROPHILS # BLD: 2.6 K/UL (ref 1.7–7.7)
NEUTROPHILS NFR BLD: 49.5 %
PLATELET # BLD AUTO: 454 K/UL (ref 135–450)
PMV BLD AUTO: 7.8 FL (ref 5–10.5)
POTASSIUM SERPL-SCNC: 4.6 MMOL/L (ref 3.5–5.1)
RBC # BLD AUTO: 4.36 M/UL (ref 4.2–5.9)
SODIUM SERPL-SCNC: 139 MMOL/L (ref 136–145)
WBC # BLD AUTO: 5.2 K/UL (ref 4–11)

## 2024-05-27 PROCEDURE — 97110 THERAPEUTIC EXERCISES: CPT

## 2024-05-27 PROCEDURE — 6370000000 HC RX 637 (ALT 250 FOR IP): Performed by: STUDENT IN AN ORGANIZED HEALTH CARE EDUCATION/TRAINING PROGRAM

## 2024-05-27 PROCEDURE — 97116 GAIT TRAINING THERAPY: CPT

## 2024-05-27 PROCEDURE — 97535 SELF CARE MNGMENT TRAINING: CPT

## 2024-05-27 PROCEDURE — 36415 COLL VENOUS BLD VENIPUNCTURE: CPT

## 2024-05-27 PROCEDURE — 85025 COMPLETE CBC W/AUTO DIFF WBC: CPT

## 2024-05-27 PROCEDURE — 80048 BASIC METABOLIC PNL TOTAL CA: CPT

## 2024-05-27 PROCEDURE — 97530 THERAPEUTIC ACTIVITIES: CPT

## 2024-05-27 PROCEDURE — 1280000000 HC REHAB R&B

## 2024-05-27 RX ORDER — POLYETHYLENE GLYCOL 3350 17 G/17G
17 POWDER, FOR SOLUTION ORAL DAILY PRN
Status: DISCONTINUED | OUTPATIENT
Start: 2024-05-27 | End: 2024-05-30 | Stop reason: HOSPADM

## 2024-05-27 RX ADMIN — MELATONIN TAB 3 MG 3 MG: 3 TAB at 20:11

## 2024-05-27 RX ADMIN — OXYCODONE 5 MG: 5 TABLET ORAL at 06:46

## 2024-05-27 RX ADMIN — METOPROLOL TARTRATE 12.5 MG: 25 TABLET, FILM COATED ORAL at 10:23

## 2024-05-27 RX ADMIN — BACITRACIN: 500 OINTMENT TOPICAL at 20:11

## 2024-05-27 RX ADMIN — APIXABAN 5 MG: 5 TABLET, FILM COATED ORAL at 10:20

## 2024-05-27 RX ADMIN — ACETAMINOPHEN 1000 MG: 500 TABLET ORAL at 10:20

## 2024-05-27 RX ADMIN — ACETAMINOPHEN 1000 MG: 500 TABLET ORAL at 20:11

## 2024-05-27 RX ADMIN — METOPROLOL TARTRATE 12.5 MG: 25 TABLET, FILM COATED ORAL at 20:11

## 2024-05-27 RX ADMIN — APIXABAN 5 MG: 5 TABLET, FILM COATED ORAL at 20:11

## 2024-05-27 ASSESSMENT — PAIN DESCRIPTION - DESCRIPTORS
DESCRIPTORS: DISCOMFORT
DESCRIPTORS: ACHING

## 2024-05-27 ASSESSMENT — PAIN SCALES - GENERAL
PAINLEVEL_OUTOF10: 5
PAINLEVEL_OUTOF10: 2
PAINLEVEL_OUTOF10: 4

## 2024-05-27 ASSESSMENT — PAIN DESCRIPTION - LOCATION
LOCATION: LEG
LOCATION: GENERALIZED

## 2024-05-27 ASSESSMENT — PAIN DESCRIPTION - ORIENTATION
ORIENTATION: LEFT
ORIENTATION: RIGHT;LEFT

## 2024-05-27 ASSESSMENT — PAIN - FUNCTIONAL ASSESSMENT: PAIN_FUNCTIONAL_ASSESSMENT: ACTIVITIES ARE NOT PREVENTED

## 2024-05-27 NOTE — PLAN OF CARE
Problem: Pain  Goal: Verbalizes/displays adequate comfort level or baseline comfort level  5/26/2024 2206 by Kalyani Wolfe RN  Outcome: Progressing     Problem: Skin/Tissue Integrity  Goal: Absence of new skin breakdown  Description: 1.  Monitor for areas of redness and/or skin breakdown  2.  Assess vascular access sites hourly  3.  Every 4-6 hours minimum:  Change oxygen saturation probe site  4.  Every 4-6 hours:  If on nasal continuous positive airway pressure, respiratory therapy assess nares and determine need for appliance change or resting period.  Outcome: Progressing     Problem: Safety - Adult  Goal: Free from fall injury  Outcome: Progressing

## 2024-05-27 NOTE — PROGRESS NOTES
session  Impairments Requiring Therapeutic Intervention: decreased functional mobility, decreased ADL status, decreased ROM, decreased strength, decreased endurance, decreased balance, increased pain  Prognosis: good  Clinical Assessment: Patient continues to make excellent progress in response to therapy services including continued ambulation and transfers with use of single (R) axillary crutch.  Patient initially had increased difficulty accepting weight through the (L) LE during initial gait training, but quickly progressed to reciprocal pattern in response to therapy activities.   Stair mobility with use of axillary crutch introduced on this date with no significant change in balance assistance. Despite improvements, patient continues to have reduced (L) LE weakness, reduced ROM, and moderate pain levels.   Pt would benefit from continued skilled PT to promote functional independence in his home environment.   Safety Interventions: patient left in chair, chair alarm in place, call light within reach, gait belt, and nurse notified    Plan  Frequency: 5 x/week, 90 min/day  Current Treatment Recommendations: strengthening, ROM, balance training, functional mobility training, transfer training, gait training, stair training, endurance training, neuromuscular re-education, wheelchair mobility training, manual therapy - soft tissue massage, modalities, patient/caregiver education, ADL/self-care training, pain management, and safety education    Goals  Patient Goals: To get back to being active   Short Term Goals:  Time Frame: 1-2 weeks  Patient will complete bed mobility at minimal assistance - goal met 5/20/2024  Patient will complete transfers at minimal assistance - goal met 5/20/2024  Patient will ambulate 25 ft with use of LRAD at minimal assistance - goal met 5/22/2024  Patient will ascend/descend 1 stairs with (B) handrail at moderate assistance - goal met 5/22/2024  Patient will complete manual w/c propulsion

## 2024-05-27 NOTE — PROGRESS NOTES
Aly Bernard  5/26/2024  [id number]    Chief Complaint: Closed displaced comminuted fracture of shaft of left tibia, initial encounter    Subjective:   Family at bedside. Patient states that he is feeling well and denies any new onset complaints.    Last BM: Stool Occurrence: 1 (05/26/24 1504)    Objective:  Patient Vitals for the past 24 hrs:   BP Temp Temp src Pulse Resp SpO2   05/26/24 2045 96/60 98.2 °F (36.8 °C) Oral 93 16 94 %   05/26/24 0858 98/65 98.6 °F (37 °C) Oral 100 18 95 %     Gen: No distress, pleasant. Visiting with a friend.   HEENT: Normocephalic, atraumatic.   CV: Regular rate and rhythm. Extremities warm, well perfused.   Resp: No respiratory distress. CTAB.   Abd: Soft, non-tender.  Ext: RLE in KI.   Neuro: Alert, oriented, appropriately interactive.     Laboratory data: Available via EMR.     Therapy progress:       PT    Supine to Sit: Independent  Sit to Supine: Independent   Sit to Stand: Supervision or touching assistance  Chair/Bed to Chair Transfer: Supervision or touching assistance  Car Transfer: Supervision or touching assistance  Ambulation 10 ft: Supervision or touching assistance  Ambulation 50 ft: Supervision or touching assistance  Ambulation 150 ft: Supervision or touching assistance  Stairs - 1 Step: Supervision or touching assistance  Stairs - 4 Step: Supervision or touching assistance  Stairs - 12 Step:      OT    Eating: Independent  Oral Hygiene: Setup or clean-up assistance  Bathing: Supervision or touching assistance  Upper Body Dressing: Independent  Lower Body Dressing: Supervision or touching assistance  Toilet Transfer: Supervision or touching assistance  Toilet Hygiene: Supervision or touching assistance    Speech Therapy         Body mass index is 41.63 kg/m².    Assessment/Plan:  Functional progress: 150' Brittny w/ hemiwalker.  This patient continues to require an ARU level of care from all disciplines to address the following issues:    #. Left comminuted and
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
## 2024-05-28 PROCEDURE — 97110 THERAPEUTIC EXERCISES: CPT

## 2024-05-28 PROCEDURE — 93005 ELECTROCARDIOGRAM TRACING: CPT | Performed by: STUDENT IN AN ORGANIZED HEALTH CARE EDUCATION/TRAINING PROGRAM

## 2024-05-28 PROCEDURE — 97116 GAIT TRAINING THERAPY: CPT

## 2024-05-28 PROCEDURE — 1280000000 HC REHAB R&B

## 2024-05-28 PROCEDURE — 6370000000 HC RX 637 (ALT 250 FOR IP): Performed by: STUDENT IN AN ORGANIZED HEALTH CARE EDUCATION/TRAINING PROGRAM

## 2024-05-28 PROCEDURE — 97530 THERAPEUTIC ACTIVITIES: CPT

## 2024-05-28 PROCEDURE — 97535 SELF CARE MNGMENT TRAINING: CPT

## 2024-05-28 RX ADMIN — OXYCODONE 5 MG: 5 TABLET ORAL at 06:11

## 2024-05-28 RX ADMIN — APIXABAN 5 MG: 5 TABLET, FILM COATED ORAL at 08:43

## 2024-05-28 RX ADMIN — MELATONIN TAB 3 MG 3 MG: 3 TAB at 20:31

## 2024-05-28 RX ADMIN — METOPROLOL TARTRATE 12.5 MG: 25 TABLET, FILM COATED ORAL at 20:31

## 2024-05-28 RX ADMIN — ACETAMINOPHEN 1000 MG: 500 TABLET ORAL at 20:31

## 2024-05-28 RX ADMIN — BACITRACIN: 500 OINTMENT TOPICAL at 08:52

## 2024-05-28 RX ADMIN — APIXABAN 5 MG: 5 TABLET, FILM COATED ORAL at 20:31

## 2024-05-28 RX ADMIN — METOPROLOL TARTRATE 12.5 MG: 25 TABLET, FILM COATED ORAL at 08:43

## 2024-05-28 ASSESSMENT — PAIN SCALES - GENERAL
PAINLEVEL_OUTOF10: 6
PAINLEVEL_OUTOF10: 0

## 2024-05-28 ASSESSMENT — PAIN SCALES - WONG BAKER: WONGBAKER_NUMERICALRESPONSE: NO HURT

## 2024-05-28 ASSESSMENT — PAIN DESCRIPTION - ONSET: ONSET: PROGRESSIVE

## 2024-05-28 ASSESSMENT — PAIN - FUNCTIONAL ASSESSMENT: PAIN_FUNCTIONAL_ASSESSMENT: ACTIVITIES ARE NOT PREVENTED

## 2024-05-28 ASSESSMENT — PAIN DESCRIPTION - LOCATION: LOCATION: LEG

## 2024-05-28 ASSESSMENT — PAIN DESCRIPTION - ORIENTATION: ORIENTATION: LEFT

## 2024-05-28 ASSESSMENT — PAIN DESCRIPTION - FREQUENCY: FREQUENCY: INTERMITTENT

## 2024-05-28 ASSESSMENT — PAIN DESCRIPTION - DESCRIPTORS: DESCRIPTORS: ACHING

## 2024-05-28 NOTE — PLAN OF CARE
Problem: Discharge Planning  Goal: Discharge to home or other facility with appropriate resources  5/28/2024 1134 by Michael Rajput, RN  Outcome: Progressing     Problem: Pain  Goal: Verbalizes/displays adequate comfort level or baseline comfort level  5/28/2024 1134 by Michael Rajput, RN  Outcome: Progressing  Flowsheets (Taken 5/28/2024 0730 by Bettye Kidd)  Verbalizes/displays adequate comfort level or baseline comfort level: Encourage patient to monitor pain and request assistance     Problem: Skin/Tissue Integrity  Goal: Absence of new skin breakdown  Description: 1.  Monitor for areas of redness and/or skin breakdown  2.  Assess vascular access sites hourly  3.  Every 4-6 hours minimum:  Change oxygen saturation probe site  4.  Every 4-6 hours:  If on nasal continuous positive airway pressure, respiratory therapy assess nares and determine need for appliance change or resting period.  5/28/2024 1134 by Michael Rajput, RN  Outcome: Progressing     Problem: Safety - Adult  Goal: Free from fall injury  5/28/2024 0341 by Chino Newman, RN  Outcome: Progressing
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
## 2024-05-28 NOTE — PROGRESS NOTES
Aly Bernard  5/27/2024  [id number]    Chief Complaint: Closed displaced comminuted fracture of shaft of left tibia, initial encounter    Subjective:   Patient states that he is having some loose stools today, denies any other symptoms.     Last BM: Stool Occurrence: 1 (05/26/24 1504)    Objective:  Patient Vitals for the past 24 hrs:   BP Temp Temp src Pulse Resp SpO2   05/27/24 2000 98/61 99.4 °F (37.4 °C) Oral 98 17 97 %   05/27/24 0716 -- -- -- -- 16 --   05/26/24 2124 -- -- -- -- 16 --   05/26/24 2045 96/60 98.2 °F (36.8 °C) Oral 93 16 94 %     Gen: No distress, pleasant. Visiting with a friend.   HEENT: Normocephalic, atraumatic.   CV: Regular rate and rhythm. Extremities warm, well perfused.   Resp: No respiratory distress. CTAB.   Abd: Soft, non-tender.  Ext: RLE in KI.   Neuro: Alert, oriented, appropriately interactive.     Laboratory data: Available via EMR.     Therapy progress:       PT    Supine to Sit: Independent  Sit to Supine: Independent   Sit to Stand: Independent  Chair/Bed to Chair Transfer: Independent  Car Transfer: Supervision or touching assistance  Ambulation 10 ft: Supervision or touching assistance  Ambulation 50 ft: Supervision or touching assistance  Ambulation 150 ft: Supervision or touching assistance  Stairs - 1 Step: Supervision or touching assistance  Stairs - 4 Step: Supervision or touching assistance  Stairs - 12 Step: Supervision or touching assistance    OT    Eating: Independent  Oral Hygiene: Setup or clean-up assistance  Bathing: Supervision or touching assistance  Upper Body Dressing: Independent  Lower Body Dressing: Supervision or touching assistance  Toilet Transfer: Supervision or touching assistance  Toilet Hygiene: Supervision or touching assistance    Speech Therapy         Body mass index is 41.63 kg/m².    Assessment/Plan:  Functional progress: 150' Brittny w/ hemiwalker.  This patient continues to require an ARU level of care from all disciplines to address the

## 2024-05-28 NOTE — PROGRESS NOTES
Removed patients surgical staples from 3 separates sites : 7 from anterior knee (Left), 4 outer lateral knee(Left), 4 inner ankle (Left). No c/o pain during removal. Fluids/snack offered, call light and personal belongings within reach.
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
## 2024-05-28 NOTE — DISCHARGE INSTR - COC
follow-up.   - Home Care PT/RN/HHA.  - No driving until cleared by a physician.     Physician Certification: I certify the above information and transfer of Aly Bernard  is necessary for the continuing treatment of the diagnosis listed and that he requires Home Care for less 30 days.     Update Admission H&P: No change in H&P    PHYSICIAN SIGNATURE:  Electronically signed by Ryan Solitario MD on 5/30/24 at 8:44 AM EDT

## 2024-05-28 NOTE — PROGRESS NOTES
Aly Bernard  5/28/2024  [id number]    Chief Complaint: Closed displaced comminuted fracture of shaft of left tibia, initial encounter    Subjective:   No acute events overnight.     Pain is adequately controlled. Denies fevers, chills, chest pain, dyspnea, abdominal pain. Discussed plan to move discharge date forward to 5/31 to which patient agrees.     Last BM: Stool Occurrence: 1 (05/26/24 1504)    Objective:  Patient Vitals for the past 24 hrs:   BP Temp Temp src Pulse Resp SpO2   05/28/24 0857 -- -- -- 86 -- --   05/28/24 0730 112/78 97.9 °F (36.6 °C) Oral 86 18 97 %   05/28/24 0641 -- -- -- -- 16 --   05/28/24 0611 -- -- -- -- 18 --   05/27/24 2000 98/61 99.4 °F (37.4 °C) Oral 98 17 97 %     Gen: No distress, pleasant.   HEENT: Normocephalic, atraumatic.   CV: Regular rate and rhythm. Extremities warm, well perfused.   Resp: No respiratory distress. CTAB.   Abd: Soft, non-tender.  Ext: RLE in KI.   Skin: R knee w/ several embedded sutures. Left lower extremities incisions are well healed, staples remain in place.   Neuro: Alert, oriented, appropriately interactive.     Laboratory data: Available via EMR.     Therapy progress:       PT    Supine to Sit: Independent  Sit to Supine: Independent   Sit to Stand: Independent  Chair/Bed to Chair Transfer: Independent  Car Transfer: Supervision or touching assistance  Ambulation 10 ft: Independent  Ambulation 50 ft: Independent  Ambulation 150 ft: Independent  Stairs - 1 Step: Independent  Stairs - 4 Step: Supervision or touching assistance  Stairs - 12 Step: Supervision or touching assistance    OT    Eating: Independent  Oral Hygiene: Independent  Bathing: Supervision or touching assistance  Upper Body Dressing: Independent  Lower Body Dressing: Supervision or touching assistance  Toilet Transfer: Independent  Toilet Hygiene: Independent    Speech Therapy         Body mass index is 41.63 kg/m².    Assessment/Plan:  Functional progress: 150' Brittny w/

## 2024-05-29 LAB
EKG ATRIAL RATE: 83 BPM
EKG DIAGNOSIS: NORMAL
EKG P AXIS: 51 DEGREES
EKG P-R INTERVAL: 136 MS
EKG Q-T INTERVAL: 366 MS
EKG QRS DURATION: 86 MS
EKG QTC CALCULATION (BAZETT): 430 MS
EKG R AXIS: 33 DEGREES
EKG T AXIS: 42 DEGREES
EKG VENTRICULAR RATE: 83 BPM

## 2024-05-29 PROCEDURE — 1280000000 HC REHAB R&B

## 2024-05-29 PROCEDURE — 97110 THERAPEUTIC EXERCISES: CPT

## 2024-05-29 PROCEDURE — 97530 THERAPEUTIC ACTIVITIES: CPT

## 2024-05-29 PROCEDURE — 97116 GAIT TRAINING THERAPY: CPT

## 2024-05-29 PROCEDURE — 97535 SELF CARE MNGMENT TRAINING: CPT

## 2024-05-29 PROCEDURE — 6370000000 HC RX 637 (ALT 250 FOR IP): Performed by: STUDENT IN AN ORGANIZED HEALTH CARE EDUCATION/TRAINING PROGRAM

## 2024-05-29 RX ADMIN — METOPROLOL TARTRATE 12.5 MG: 25 TABLET, FILM COATED ORAL at 09:15

## 2024-05-29 RX ADMIN — METOPROLOL TARTRATE 12.5 MG: 25 TABLET, FILM COATED ORAL at 20:16

## 2024-05-29 RX ADMIN — ACETAMINOPHEN 1000 MG: 500 TABLET ORAL at 19:20

## 2024-05-29 RX ADMIN — MELATONIN TAB 3 MG 3 MG: 3 TAB at 19:20

## 2024-05-29 RX ADMIN — APIXABAN 5 MG: 5 TABLET, FILM COATED ORAL at 09:15

## 2024-05-29 RX ADMIN — APIXABAN 5 MG: 5 TABLET, FILM COATED ORAL at 19:20

## 2024-05-29 RX ADMIN — OXYCODONE 5 MG: 5 TABLET ORAL at 19:20

## 2024-05-29 ASSESSMENT — PAIN DESCRIPTION - PAIN TYPE
TYPE: ACUTE PAIN

## 2024-05-29 ASSESSMENT — PAIN DESCRIPTION - ORIENTATION
ORIENTATION: LEFT

## 2024-05-29 ASSESSMENT — PAIN SCALES - GENERAL
PAINLEVEL_OUTOF10: 3
PAINLEVEL_OUTOF10: 5
PAINLEVEL_OUTOF10: 2
PAINLEVEL_OUTOF10: 7
PAINLEVEL_OUTOF10: 2

## 2024-05-29 ASSESSMENT — PAIN SCALES - WONG BAKER
WONGBAKER_NUMERICALRESPONSE: HURTS A LITTLE BIT
WONGBAKER_NUMERICALRESPONSE: NO HURT

## 2024-05-29 ASSESSMENT — PAIN - FUNCTIONAL ASSESSMENT
PAIN_FUNCTIONAL_ASSESSMENT: ACTIVITIES ARE NOT PREVENTED

## 2024-05-29 ASSESSMENT — PAIN DESCRIPTION - FREQUENCY: FREQUENCY: INTERMITTENT

## 2024-05-29 ASSESSMENT — PAIN DESCRIPTION - DESCRIPTORS
DESCRIPTORS: TIGHTNESS;THROBBING
DESCRIPTORS: TIGHTNESS;THROBBING

## 2024-05-29 ASSESSMENT — PAIN DESCRIPTION - ONSET: ONSET: PROGRESSIVE

## 2024-05-29 ASSESSMENT — PAIN DESCRIPTION - LOCATION
LOCATION: LEG

## 2024-05-29 NOTE — PLAN OF CARE
Problem: Discharge Planning  Goal: Discharge to home or other facility with appropriate resources  5/29/2024 1019 by Delores Smith RN  Outcome: Progressing     Problem: ABCDS Injury Assessment  Goal: Absence of physical injury  5/29/2024 1019 by Delores Smith RN  Outcome: Progressing     Problem: Pain  Goal: Verbalizes/displays adequate comfort level or baseline comfort level  5/29/2024 1019 by Delores Smith RN  Outcome: Progressing     Problem: Skin/Tissue Integrity  Goal: Absence of new skin breakdown  Description: 1.  Monitor for areas of redness and/or skin breakdown  2.  Assess vascular access sites hourly  3.  Every 4-6 hours minimum:  Change oxygen saturation probe site  4.  Every 4-6 hours:  If on nasal continuous positive airway pressure, respiratory therapy assess nares and determine need for appliance change or resting period.  5/29/2024 1019 by Delores Smith RN  Outcome: Progressing     Problem: Safety - Adult  Goal: Free from fall injury  5/29/2024 1019 by Delores Smith RN  Outcome: Progressing

## 2024-05-29 NOTE — PROGRESS NOTES
modified independent - gaol met 5/29  Patient will complete toileting at modified independent - goal met 5/28  Patient will complete functional transfers at modified independent - goal met 5/28  Patient will complete functional mobility at modified independent - goal met 5/29  Patient to gather and transport IADL items at supervision - goal met, pt Nuvia 5/28      Above goals reviewed on 5/29/2024.  All goals are ongoing at this time unless indicated above.       Therapy Session Time     Individual Group Co-treatment   Time In 1015     Time Out 1115     Minutes 60        Second Session Therapy Time:   Individual Concurrent Group Co-treatment   Time In 1245        Time Out 1315        Minutes 30            Timed Code Treatment Minutes: 60 +30  Total Treatment Minutes: 90       Electronically Signed By: Bautista Mccann OT  1st Session: LORIE Magaña/L #[id number]   2nd Session: MINA Christianson, CNS ([id number]) 5/29/2024 1:48 PM
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
## 2024-05-30 VITALS
OXYGEN SATURATION: 98 % | SYSTOLIC BLOOD PRESSURE: 126 MMHG | DIASTOLIC BLOOD PRESSURE: 82 MMHG | BODY MASS INDEX: 41.53 KG/M2 | HEIGHT: 70 IN | TEMPERATURE: 98.2 F | HEART RATE: 108 BPM | RESPIRATION RATE: 18 BRPM | WEIGHT: 290.1 LBS

## 2024-05-30 LAB
ANION GAP SERPL CALCULATED.3IONS-SCNC: 10 MMOL/L (ref 3–16)
BASOPHILS # BLD: 0.1 K/UL (ref 0–0.2)
BASOPHILS NFR BLD: 1.2 %
BUN SERPL-MCNC: 17 MG/DL (ref 7–20)
CALCIUM SERPL-MCNC: 9.5 MG/DL (ref 8.3–10.6)
CHLORIDE SERPL-SCNC: 103 MMOL/L (ref 99–110)
CO2 SERPL-SCNC: 24 MMOL/L (ref 21–32)
CREAT SERPL-MCNC: 1 MG/DL (ref 0.9–1.3)
DEPRECATED RDW RBC AUTO: 13.1 % (ref 12.4–15.4)
EOSINOPHIL # BLD: 0.1 K/UL (ref 0–0.6)
EOSINOPHIL NFR BLD: 2.3 %
GFR SERPLBLD CREATININE-BSD FMLA CKD-EPI: >90 ML/MIN/{1.73_M2}
GLUCOSE SERPL-MCNC: 109 MG/DL (ref 70–99)
HCT VFR BLD AUTO: 40 % (ref 40.5–52.5)
HGB BLD-MCNC: 13.7 G/DL (ref 13.5–17.5)
LYMPHOCYTES # BLD: 2.2 K/UL (ref 1–5.1)
LYMPHOCYTES NFR BLD: 33.3 %
MCH RBC QN AUTO: 31 PG (ref 26–34)
MCHC RBC AUTO-ENTMCNC: 34.2 G/DL (ref 31–36)
MCV RBC AUTO: 90.7 FL (ref 80–100)
MONOCYTES # BLD: 0.7 K/UL (ref 0–1.3)
MONOCYTES NFR BLD: 10.3 %
NEUTROPHILS # BLD: 3.5 K/UL (ref 1.7–7.7)
NEUTROPHILS NFR BLD: 52.9 %
PLATELET # BLD AUTO: 426 K/UL (ref 135–450)
PMV BLD AUTO: 8 FL (ref 5–10.5)
POTASSIUM SERPL-SCNC: 4.4 MMOL/L (ref 3.5–5.1)
RBC # BLD AUTO: 4.4 M/UL (ref 4.2–5.9)
SODIUM SERPL-SCNC: 137 MMOL/L (ref 136–145)
WBC # BLD AUTO: 6.6 K/UL (ref 4–11)

## 2024-05-30 PROCEDURE — 6370000000 HC RX 637 (ALT 250 FOR IP): Performed by: STUDENT IN AN ORGANIZED HEALTH CARE EDUCATION/TRAINING PROGRAM

## 2024-05-30 PROCEDURE — 80048 BASIC METABOLIC PNL TOTAL CA: CPT

## 2024-05-30 PROCEDURE — 85025 COMPLETE CBC W/AUTO DIFF WBC: CPT

## 2024-05-30 RX ORDER — POLYETHYLENE GLYCOL 3350 17 G/17G
17 POWDER, FOR SOLUTION ORAL DAILY PRN
Qty: 30 PACKET | Refills: 0
Start: 2024-05-30 | End: 2024-06-29

## 2024-05-30 RX ORDER — OXYCODONE HYDROCHLORIDE 5 MG/1
5 TABLET ORAL DAILY PRN
Qty: 5 TABLET | Refills: 0 | Status: SHIPPED | OUTPATIENT
Start: 2024-05-30 | End: 2024-06-04

## 2024-05-30 RX ORDER — LIDOCAINE 4 G/G
1 PATCH TOPICAL DAILY
Qty: 7 EACH | Refills: 0 | Status: SHIPPED | OUTPATIENT
Start: 2024-05-31

## 2024-05-30 RX ADMIN — APIXABAN 5 MG: 5 TABLET, FILM COATED ORAL at 08:13

## 2024-05-30 RX ADMIN — METOPROLOL TARTRATE 12.5 MG: 25 TABLET, FILM COATED ORAL at 08:13

## 2024-05-30 ASSESSMENT — PAIN DESCRIPTION - DESCRIPTORS: DESCRIPTORS: TIGHTNESS;THROBBING

## 2024-05-30 ASSESSMENT — PAIN DESCRIPTION - LOCATION: LOCATION: LEG

## 2024-05-30 ASSESSMENT — PAIN - FUNCTIONAL ASSESSMENT: PAIN_FUNCTIONAL_ASSESSMENT: ACTIVITIES ARE NOT PREVENTED

## 2024-05-30 ASSESSMENT — PAIN DESCRIPTION - PAIN TYPE: TYPE: ACUTE PAIN

## 2024-05-30 ASSESSMENT — PAIN SCALES - GENERAL: PAINLEVEL_OUTOF10: 2

## 2024-05-30 ASSESSMENT — PAIN DESCRIPTION - ORIENTATION: ORIENTATION: LEFT

## 2024-05-30 NOTE — PROGRESS NOTES
CLINICAL PHARMACY NOTE: MEDS TO BEDS    Total # of Prescriptions Filled: 4   The following medications were delivered to the patient:  METOPROLOL TARTRATE 25MG  ELIQUIS 5MG  OXYCODONE HCL 5MG  LIDOCAINE PATCHES    Additional Documentation:  Delivered to patients room = signed  Ok to be delivered per VIKASH Barrera - Pharmacy Tech.

## 2024-05-30 NOTE — PROGRESS NOTES
Aly Bernard  5/29/2024  [id number]    Chief Complaint: Closed displaced comminuted fracture of shaft of left tibia, initial encounter    Subjective:   No acute events overnight.     Pain controlled well on current regimen, requiring oxycodone 5 mg on average once per day. Denies chest pain, dyspnea, abdominal pain. Has no concerns with plan for discharge home tomorrow.     Last BM: Stool Occurrence: 1 (05/26/24 1504)    Objective:  Patient Vitals for the past 24 hrs:   BP Temp Temp src Pulse Resp SpO2   05/29/24 2000 114/78 98.7 °F (37.1 °C) Oral (!) 101 18 96 %   05/29/24 1950 -- -- -- -- 18 --   05/29/24 1920 -- -- -- -- 18 --   05/29/24 0845 121/80 97.5 °F (36.4 °C) Oral 95 18 97 %     Gen: No distress, pleasant.   HEENT: Normocephalic, atraumatic.   CV: Regular rate and rhythm. Extremities warm, well perfused.   Resp: No respiratory distress. CTAB.   Abd: Soft, non-tender.  Ext: RLE in KI.   Neuro: Alert, oriented, appropriately interactive.     Laboratory data: Available via EMR.     Therapy progress:       PT    Supine to Sit: Independent  Sit to Supine: Independent   Sit to Stand: Independent  Chair/Bed to Chair Transfer: Independent  Car Transfer: Independent  Ambulation 10 ft: Independent  Ambulation 50 ft: Independent  Ambulation 150 ft: Independent  Stairs - 1 Step: Independent  Stairs - 4 Step: Independent  Stairs - 12 Step: Independent    OT    Eating: Independent  Oral Hygiene: Independent  Bathing: Independent  Upper Body Dressing: Independent  Lower Body Dressing: Independent  Toilet Transfer: Independent  Toilet Hygiene: Independent    Speech Therapy         Body mass index is 41.63 kg/m².    Assessment/Plan:  Functional progress: 12 6-inch steps mod-I with crutches  This patient continues to require an ARU level of care from all disciplines to address the following issues:    #. Left comminuted and displaced left third metacarpal base fracture, minimally displaced left fourth proximal phalanx
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
## 2024-05-30 NOTE — PLAN OF CARE
Problem: Discharge Planning  Goal: Discharge to home or other facility with appropriate resources  5/29/2024 2342 by Palma Mims RN  Outcome: Adequate for Discharge     Problem: ABCDS Injury Assessment  Goal: Absence of physical injury  5/29/2024 2342 by Palma Mims RN  Outcome: Adequate for Discharge     Problem: Pain  Goal: Verbalizes/displays adequate comfort level or baseline comfort level  5/29/2024 2342 by Palma Mims RN  Outcome: Adequate for Discharge     Problem: Skin/Tissue Integrity  Goal: Absence of new skin breakdown  Description: 1.  Monitor for areas of redness and/or skin breakdown  2.  Assess vascular access sites hourly  3.  Every 4-6 hours minimum:  Change oxygen saturation probe site  4.  Every 4-6 hours:  If on nasal continuous positive airway pressure, respiratory therapy assess nares and determine need for appliance change or resting period.  5/29/2024 2342 by Palma Mims RN  Outcome: Adequate for Discharge     Problem: Safety - Adult  Goal: Free from fall injury  5/29/2024 2342 by Palma Mims RN  Outcome: Adequate for Discharge

## 2024-05-30 NOTE — PLAN OF CARE
Problem: Discharge Planning  Goal: Discharge to home or other facility with appropriate resources  5/30/2024 0914 by Sarika Dueñas RN  Outcome: Progressing     Problem: ABCDS Injury Assessment  Goal: Absence of physical injury  5/30/2024 0914 by Sarika Dueñas RN  Outcome: Progressing     Problem: Pain  Goal: Verbalizes/displays adequate comfort level or baseline comfort level  5/30/2024 0914 by Sarika Dueñas RN  Outcome: Progressing

## 2024-05-30 NOTE — DISCHARGE INSTR - DIET

## 2024-05-30 NOTE — PROGRESS NOTES
= 12-14 days (nearly every day)  **Leave blank if 'No Reponse'**      Enter scores in boxes    Column 1 Column 2   Little interest or pleasure in doing things   0 0   Feeling down, depressed, or hopeless   0 0   **If either A or B in column 2 is coded “2” or “3”, CONTINUE asking the questions below.  If not, END the interview.**     Trouble falling or staying asleep, or sleeping too much       Feeling tired or having little energy       Poor appetite or overeating       Feeling bad about yourself - or that you are a failure or have let yourself or your family down       Trouble concentrating on things, such as reading the newspaper or watching television       Moving or speaking so slowly that other people could have noticed.  Or the opposite- being so fidgety or restless that you have been moving around a lot more than usual.       Thoughts that you would be better off dead, or of hurting yourself in some way.       Total Severity: Add scores for all frequency responses in column 2 (possible score 0-27, or enter 99 if unable to complete (if symptom frequency (column 2) is blank for 3 or more items).   0     Social Isolation  \"How often do you feel lonely or isolated from those around you?\"  [] 0.  Never  [x] 1.  Rarely  [] 2.  Sometimes  [] 3.  Often  [] 4.  Always  [] 7.  Patient declines to respond  [] 8.  Patient unable to respond    Pain Effect on Sleep  \"Over the past 5 days, how much of the time has pain made it hard for you to sleep at night?\"  []  0.  Does not apply - I have not had any pain or hurting in the past 5 days  [x]  1.  Rarely or not at all  []  2.  Occasionally  []  3.  Frequently  []  4.  Almost constantly  []  8.  Unable to answer    **If the patient answers \"0.  Does not apply\" to this question, skip the next two \"Pain Effect...\" questions**    Pain Interference with Therapy Activities  \"Over the past 5 days, how often have you limited your participation in rehabilitation therapy sessions due